# Patient Record
Sex: MALE | Race: WHITE | NOT HISPANIC OR LATINO | Employment: FULL TIME | URBAN - METROPOLITAN AREA
[De-identification: names, ages, dates, MRNs, and addresses within clinical notes are randomized per-mention and may not be internally consistent; named-entity substitution may affect disease eponyms.]

---

## 2020-02-15 ENCOUNTER — HOSPITAL ENCOUNTER (EMERGENCY)
Facility: HOSPITAL | Age: 40
Discharge: HOME | End: 2020-02-15
Admitting: EMERGENCY MEDICINE

## 2020-02-15 ENCOUNTER — APPOINTMENT (OUTPATIENT)
Dept: GENERAL RADIOLOGY | Facility: HOSPITAL | Age: 40
End: 2020-02-15

## 2020-02-15 VITALS
SYSTOLIC BLOOD PRESSURE: 124 MMHG | OXYGEN SATURATION: 100 % | HEART RATE: 84 BPM | TEMPERATURE: 97.4 F | RESPIRATION RATE: 16 BRPM | BODY MASS INDEX: 28.93 KG/M2 | HEIGHT: 67 IN | WEIGHT: 184.3 LBS | DIASTOLIC BLOOD PRESSURE: 74 MMHG

## 2020-02-15 DIAGNOSIS — M54.12 CERVICAL RADICULAR PAIN: Primary | ICD-10-CM

## 2020-02-15 PROCEDURE — 99283 EMERGENCY DEPT VISIT LOW MDM: CPT

## 2020-02-15 PROCEDURE — 72050 X-RAY EXAM NECK SPINE 4/5VWS: CPT

## 2020-02-15 PROCEDURE — 96372 THER/PROPH/DIAG INJ SC/IM: CPT

## 2020-02-15 PROCEDURE — 25010000002 KETOROLAC TROMETHAMINE PER 15 MG: Performed by: NURSE PRACTITIONER

## 2020-02-15 RX ORDER — KETOROLAC TROMETHAMINE 30 MG/ML
30 INJECTION, SOLUTION INTRAMUSCULAR; INTRAVENOUS ONCE
Status: COMPLETED | OUTPATIENT
Start: 2020-02-15 | End: 2020-02-15

## 2020-02-15 RX ORDER — CYCLOBENZAPRINE HCL 10 MG
10 TABLET ORAL ONCE
Status: COMPLETED | OUTPATIENT
Start: 2020-02-15 | End: 2020-02-15

## 2020-02-15 RX ORDER — CYCLOBENZAPRINE HCL 10 MG
10 TABLET ORAL 3 TIMES DAILY PRN
Qty: 15 TABLET | Refills: 0 | Status: SHIPPED | OUTPATIENT
Start: 2020-02-15 | End: 2022-10-18

## 2020-02-15 RX ORDER — METHYLPREDNISOLONE 4 MG/1
TABLET ORAL
Qty: 21 TABLET | Refills: 0 | Status: SHIPPED | OUTPATIENT
Start: 2020-02-15 | End: 2022-10-18

## 2020-02-15 RX ADMIN — CYCLOBENZAPRINE HYDROCHLORIDE 10 MG: 10 TABLET, FILM COATED ORAL at 15:38

## 2020-02-15 RX ADMIN — KETOROLAC TROMETHAMINE 30 MG: 30 INJECTION, SOLUTION INTRAMUSCULAR at 15:38

## 2020-02-15 NOTE — ED PROVIDER NOTES
Subjective   History:    40-year-old male presents the emergency department today for evaluation of neck and shoulder pain that is been present for 2 years but is gotten worse over the last 2 months he has started a new job where he has to do a lot of heavy lifting.  Patient states that he has numbness and tingling in the index and thumb bilaterally.  He has tried ibuprofen and Tylenol at home and has not had significant reduction in pain.      Onset: 2 years, worse over the last 2 months  Location: Cervical spine, radiating to shoulders, hands  Duration: Continuous  Character: Aching  Aggravating/Alleviating factors: Worse with lifting, no identified alleviating factors  Radiation: Shoulders, hands  Severity: Moderate to severe          Review of Systems   Constitutional: Negative for activity change, appetite change, diaphoresis, fatigue and fever.   HENT: Negative for congestion, facial swelling and sinus pressure.    Eyes: Negative for pain and visual disturbance.   Respiratory: Negative for cough, chest tightness and shortness of breath.    Cardiovascular: Negative for chest pain and palpitations.   Gastrointestinal: Negative for abdominal distention and abdominal pain.   Genitourinary: Negative for difficulty urinating and frequency.   Musculoskeletal: Positive for back pain, myalgias and neck pain. Negative for arthralgias and neck stiffness.        Neck pain per HPI   Skin: Negative for color change and rash.   Neurological: Positive for numbness. Negative for dizziness, syncope, weakness, light-headedness and headaches.       Past Medical History:   Diagnosis Date   • Injury of back        No Known Allergies    Past Surgical History:   Procedure Laterality Date   • APPENDECTOMY         History reviewed. No pertinent family history.    Social History     Socioeconomic History   • Marital status:      Spouse name: Not on file   • Number of children: Not on file   • Years of education: Not on file   •  Highest education level: Not on file   Tobacco Use   • Smoking status: Current Every Day Smoker     Packs/day: 1.00     Types: Cigarettes   Substance and Sexual Activity   • Alcohol use: Not Currently   • Drug use: Not Currently           Objective   Physical Exam   Constitutional: He is oriented to person, place, and time. He appears well-developed and well-nourished.   HENT:   Head: Normocephalic and atraumatic.   Eyes: Pupils are equal, round, and reactive to light. Conjunctivae and EOM are normal.   Neck: Neck supple. Muscular tenderness present. No spinous process tenderness present. No neck rigidity. Decreased range of motion present. No edema and no erythema present.   Range of motion in flexion extension and rotation due to pain.   Cardiovascular: Normal rate, regular rhythm, normal heart sounds and intact distal pulses.   Pulmonary/Chest: Effort normal and breath sounds normal.   Abdominal: Soft. Bowel sounds are normal.   Musculoskeletal:   Strength in bilateral upper extremities equal and normal, pulses intact   Neurological: He is alert and oriented to person, place, and time.   Skin: Skin is warm and dry. Capillary refill takes less than 2 seconds.   Psychiatric: He has a normal mood and affect. His behavior is normal. Judgment and thought content normal.       Procedures           ED Course      XR Spine Cervical Complete 4 or 5 View   Final Result   Normal cervical spine x-ray series.       Electronically Signed By-Brooks Hodge On:2/15/2020 3:48 PM   This report was finalized on [id number] by  Brooks Hodge, .        Medications   ketorolac (TORADOL) injection 30 mg (30 mg Intramuscular Given 2/15/20 1538)   cyclobenzaprine (FLEXERIL) tablet 10 mg (10 mg Oral Given 2/15/20 1538)                                             MDM  Number of Diagnoses or Management Options  Cervical radicular pain:   Diagnosis management comments: DISPOSITION:   Chart Review:  Comorbidity:  has a past medical  history of Injury of back.  Differentials:this list is not all inclusive and does not constitute the entirety of considered causes --> degenerative disc disease, herniated disc, cervical strain    Imaging: Was interpreted by physician and reviewed by myself:  Xr Spine Cervical Complete 4 Or 5 View    Result Date: 2/15/2020  Normal cervical spine x-ray series.  Electronically Signed By-Brooks Hodge On:2/15/2020 3:48 PM This report was finalized on [id number] by  Brooks Hodge, .      Disposition/Treatment:    40-year-old male presented to the emergency department today for evaluation of acute on chronic neck pain.  Patient described pain as being in shoulder but it originates in the neck and radiates there.  Patient was given Flexeril and Toradol in the emergency department and had minimal reduction of pain.  Cervical spine x-ray was obtained and was found to be normal.  Findings discussed with patient.  Patient will be discharged home stable condition with prescription for Medrol Dosepak and cyclobenzaprine.  Patient instructed to follow-up with primary care provider for further evaluation of neck pain.  Patient was also given neck exercises to perform to try to alleviate the pain.  Patient is agreeable to plan.  Will return to the emergency department with new or worsening symptoms.         Amount and/or Complexity of Data Reviewed  Tests in the radiology section of CPT®: reviewed        Final diagnoses:   Cervical radicular pain            Azeb Ortega, PATRICIA  02/15/20 1648

## 2020-02-15 NOTE — DISCHARGE INSTRUCTIONS
Take Medrol Dosepak until completion.  May resume ibuprofen when it is finished.  You may take Flexeril up to 3 times a day for muscle spasm.  Do not use these if you are going to be operating or around heavy machinery.  Perform neck stretches exercises daily.  Follow-up with primary care provider in 1 week if no better and return there or to the emergency department for any new or worsening symptoms.

## 2020-02-15 NOTE — ED NOTES
C/o left shoulder and neck pain x 2 yrs intermittently with worsening symptoms x 2 months since starting since starting new job     Rand Akbar RN  02/15/20 8305

## 2022-10-03 ENCOUNTER — HOSPITAL ENCOUNTER (OUTPATIENT)
Facility: HOSPITAL | Age: 42
Discharge: HOME | End: 2022-10-03
Attending: EMERGENCY MEDICINE | Admitting: EMERGENCY MEDICINE

## 2022-10-03 VITALS
OXYGEN SATURATION: 99 % | RESPIRATION RATE: 18 BRPM | DIASTOLIC BLOOD PRESSURE: 88 MMHG | BODY MASS INDEX: 29.03 KG/M2 | HEART RATE: 86 BPM | HEIGHT: 67 IN | WEIGHT: 185 LBS | TEMPERATURE: 98 F | SYSTOLIC BLOOD PRESSURE: 128 MMHG

## 2022-10-03 DIAGNOSIS — B97.89 VIRAL RESPIRATORY ILLNESS: Primary | ICD-10-CM

## 2022-10-03 DIAGNOSIS — J98.8 VIRAL RESPIRATORY ILLNESS: Primary | ICD-10-CM

## 2022-10-03 LAB
FLUAV SUBTYP SPEC NAA+PROBE: NOT DETECTED
FLUBV RNA ISLT QL NAA+PROBE: NOT DETECTED
SARS-COV-2 RNA RESP QL NAA+PROBE: NOT DETECTED
STREP A PCR: NOT DETECTED

## 2022-10-03 PROCEDURE — G0463 HOSPITAL OUTPT CLINIC VISIT: HCPCS | Performed by: EMERGENCY MEDICINE

## 2022-10-03 PROCEDURE — 87636 SARSCOV2 & INF A&B AMP PRB: CPT | Performed by: EMERGENCY MEDICINE

## 2022-10-03 PROCEDURE — 99202 OFFICE O/P NEW SF 15 MIN: CPT | Performed by: EMERGENCY MEDICINE

## 2022-10-03 PROCEDURE — 87651 STREP A DNA AMP PROBE: CPT | Performed by: EMERGENCY MEDICINE

## 2022-10-03 RX ORDER — BROMPHENIRAMINE MALEATE, PSEUDOEPHEDRINE HYDROCHLORIDE, AND DEXTROMETHORPHAN HYDROBROMIDE 2; 30; 10 MG/5ML; MG/5ML; MG/5ML
10 SYRUP ORAL 4 TIMES DAILY PRN
Qty: 118 ML | Refills: 0 | Status: SHIPPED | OUTPATIENT
Start: 2022-10-03 | End: 2022-10-18

## 2022-10-03 RX ORDER — ACETAMINOPHEN 500 MG
1000 TABLET ORAL ONCE
Status: COMPLETED | OUTPATIENT
Start: 2022-10-03 | End: 2022-10-03

## 2022-10-03 RX ORDER — IBUPROFEN 400 MG/1
400 TABLET ORAL ONCE
Status: COMPLETED | OUTPATIENT
Start: 2022-10-03 | End: 2022-10-03

## 2022-10-03 RX ADMIN — ACETAMINOPHEN 1000 MG: 500 TABLET ORAL at 11:23

## 2022-10-03 RX ADMIN — IBUPROFEN 400 MG: 400 TABLET, FILM COATED ORAL at 11:23

## 2022-10-03 NOTE — DISCHARGE INSTRUCTIONS
Please read the attached discharge instructions.  Come back to the emergency department for any new or concerning symptoms.    Take 400mg ibuprofen and 1000mg Tylenol together every 6 hours as needed for pain.

## 2022-10-03 NOTE — ED PROVIDER NOTES
[facility] FREE-STANDING ED / URGENT CARE    Patient: Roque Reveles 1980 [id number]  Physician: Kimberly Mares MD  DOS: 10/3/2022    [facility]  History of Present Illness  42-year-old male with no significant past medical history presents with 3 days of nasal congestion, body aches, chills, and sore throat.  He has taken Advil with no improvement.  He has had diarrhea and decreased appetite, but no nausea or vomiting.  Chills, but no fever.  No known sick contacts.        ROS  As reviewed in HPI above.    There is no problem list on file for this patient.    Prior to Admission medications    Medication Sig Start Date End Date Taking? Authorizing Provider   cyclobenzaprine (FLEXERIL) 10 MG tablet Take 1 tablet by mouth 3 (Three) Times a Day As Needed for Muscle Spasms for up to 15 doses. 2/15/20   Azeb Dunlap APRN   methylPREDNISolone (MEDROL, JUDI,) 4 MG tablet Take as directed on package instructions. 2/15/20   Azeb Dunlap APRN     Past Medical History:   Diagnosis Date   • Injury of back      Past Surgical History:   Procedure Laterality Date   • APPENDECTOMY       History reviewed. No pertinent family history.  Social History     Socioeconomic History   • Marital status:    Tobacco Use   • Smoking status: Current Every Day Smoker     Packs/day: 1.00     Types: Cigarettes   Substance and Sexual Activity   • Alcohol use: Not Currently   • Drug use: Not Currently     No Known Allergies    PHYSICAL EXAM  Vital Signs (last day)     Date/Time Temp Temp src Pulse Resp BP Patient Position SpO2    10/03/22 1047 98.2 (36.8) Oral 86 16 129/91 Sitting 98        Physical Exam  Vitals and nursing note reviewed.   Constitutional:       General: He is not in acute distress.     Appearance: He is well-developed.   HENT:      Head: Normocephalic and atraumatic.      Nose: Congestion present.      Mouth/Throat:      Comments: Mild posterior oropharynx erythema without significant tonsillar swelling.  Eyes:       Conjunctiva/sclera: Conjunctivae normal.   Cardiovascular:      Rate and Rhythm: Normal rate and regular rhythm.      Heart sounds: No murmur heard.  Pulmonary:      Effort: Pulmonary effort is normal.      Breath sounds: Normal breath sounds.   Abdominal:      General: There is no distension.      Palpations: Abdomen is soft.   Skin:     General: Skin is warm and dry.   Neurological:      Mental Status: He is alert.   Psychiatric:         Mood and Affect: Mood normal.         Behavior: Behavior normal.           DIAGNOSTICS  No radiology results for the last day    Labs Reviewed   COVID-19 AND FLU A/B, NP SWAB IN TRANSPORT MEDIA 8-12 HR TAT - Normal    Narrative:     Fact sheet for providers: https://www.fda.gov/media/812141/download    Fact sheet for patients: https://www.fda.gov/media/692115/download    Test performed by PCR.   RAPID STREP A SCREEN - Normal         MDM  Number of Diagnoses or Management Options  Viral respiratory illness  Diagnosis management comments: Flu, COVID, strep test are negative.  Presentation is most consistent with viral respiratory infection.  Chest pain is likely due to the cough and bronchial component of the illness.  Patient discharged home with Bromfed-DM.           Medications   acetaminophen (TYLENOL) tablet 1,000 mg (1,000 mg Oral Given 10/3/22 1123)   ibuprofen (ADVIL,MOTRIN) tablet 400 mg (400 mg Oral Given 10/3/22 1123)       Procedures    Final diagnoses:   Viral respiratory illness       PATIENT CONNECTION - [facility] [id number]  [phone]  In 1 week  If no improvement, If primary care is needed      ED Disposition     ED Disposition   Discharge    Condition   Stable    Comment   --             MD Vishnu Erickson, Kimberly Dillard MD  10/03/22 2119

## 2022-10-14 ENCOUNTER — HOSPITAL ENCOUNTER (OUTPATIENT)
Facility: HOSPITAL | Age: 42
Discharge: HOME | End: 2022-10-14
Attending: EMERGENCY MEDICINE | Admitting: EMERGENCY MEDICINE

## 2022-10-14 VITALS
RESPIRATION RATE: 16 BRPM | SYSTOLIC BLOOD PRESSURE: 147 MMHG | DIASTOLIC BLOOD PRESSURE: 96 MMHG | WEIGHT: 185 LBS | HEART RATE: 102 BPM | TEMPERATURE: 98 F | OXYGEN SATURATION: 100 % | BODY MASS INDEX: 29.03 KG/M2 | HEIGHT: 67 IN

## 2022-10-14 DIAGNOSIS — Z72.0 TOBACCO ABUSE: ICD-10-CM

## 2022-10-14 DIAGNOSIS — J40 BRONCHITIS: Primary | ICD-10-CM

## 2022-10-14 PROCEDURE — 25010000002 DEXAMETHASONE PER 1 MG: Performed by: EMERGENCY MEDICINE

## 2022-10-14 PROCEDURE — 99213 OFFICE O/P EST LOW 20 MIN: CPT | Performed by: EMERGENCY MEDICINE

## 2022-10-14 PROCEDURE — G0463 HOSPITAL OUTPT CLINIC VISIT: HCPCS | Performed by: EMERGENCY MEDICINE

## 2022-10-14 RX ORDER — BENZONATATE 200 MG/1
200 CAPSULE ORAL 3 TIMES DAILY PRN
Qty: 20 CAPSULE | Refills: 0 | Status: SHIPPED | OUTPATIENT
Start: 2022-10-14

## 2022-10-14 RX ORDER — AZITHROMYCIN 250 MG
TABLET ORAL
Qty: 6 TABLET | Refills: 0 | Status: SHIPPED | OUTPATIENT
Start: 2022-10-14 | End: 2022-10-18

## 2022-10-14 RX ORDER — FLUTICASONE PROPIONATE 50 MCG
2 SPRAY, SUSPENSION (ML) NASAL DAILY
Qty: 9.9 ML | Refills: 0 | Status: SHIPPED | OUTPATIENT
Start: 2022-10-14

## 2022-10-14 RX ADMIN — DEXAMETHASONE SODIUM PHOSPHATE 10 MG: 10 INJECTION, SOLUTION INTRAMUSCULAR; INTRAVENOUS at 14:08

## 2022-10-14 NOTE — ED PROVIDER NOTES
Subjective   History of Present Illness  Patient reports having few week duration of cough, congestion.  He is producing sputum.  He was diagnosed with viral syndrome weeks ago but issues are not improving.  He does smoke cigarettes.  Denies dyspnea.  When he lies down to sleep he is having issues sleeping due to his symptoms.  No respiratory distress or fevers        Review of Systems   All other systems reviewed and are negative.      Past Medical History:   Diagnosis Date   • Injury of back        No Known Allergies    Past Surgical History:   Procedure Laterality Date   • APPENDECTOMY         History reviewed. No pertinent family history.    Social History     Socioeconomic History   • Marital status:    Tobacco Use   • Smoking status: Every Day     Packs/day: 1.00     Types: Cigarettes   Substance and Sexual Activity   • Alcohol use: Not Currently   • Drug use: Not Currently           Objective   Physical Exam  Vitals and nursing note reviewed.   Constitutional:       Appearance: Normal appearance.   HENT:      Head: Normocephalic.      Right Ear: External ear normal.      Left Ear: External ear normal.      Nose: Nose normal.      Mouth/Throat:      Pharynx: Posterior oropharyngeal erythema present. No oropharyngeal exudate.   Eyes:      Conjunctiva/sclera: Conjunctivae normal.   Cardiovascular:      Rate and Rhythm: Normal rate.   Pulmonary:      Effort: Pulmonary effort is normal.      Breath sounds: Normal breath sounds.   Abdominal:      General: There is no distension.   Musculoskeletal:      Cervical back: Normal range of motion.   Skin:     Findings: No rash.   Neurological:      Mental Status: He is alert and oriented to person, place, and time.   Psychiatric:         Mood and Affect: Mood normal.         Procedures           ED Course                                           MDM  Number of Diagnoses or Management Options  Bronchitis  Tobacco abuse  Diagnosis management comments: With symptoms  over 2 weeks, will start antibiotics.  No wheezing on exam.  Vital signs look overall safe for discharge.  He is a smoker.  Antibiotic should help with bacterial component to his bronchitis      Final diagnoses:   Bronchitis   Tobacco abuse       ED Disposition  ED Disposition     ED Disposition   Discharge    Condition   Stable    Comment   --             No follow-up provider specified.       Medication List      New Prescriptions    benzonatate 200 MG capsule  Commonly known as: TESSALON  Take 1 capsule by mouth 3 (Three) Times a Day As Needed for Cough.     fluticasone 50 MCG/ACT nasal spray  Commonly known as: FLONASE  2 sprays into the nostril(s) as directed by provider Daily.     Zithromax 250 MG tablet  Generic drug: azithromycin  Zpak, as directed           Where to Get Your Medications      These medications were sent to [facility] [id number] - [place], [place] - [address] [place] [address] - [phone]  - [phone] [address] [place] [address]    Phone: [phone]   · benzonatate 200 MG capsule  · fluticasone 50 MCG/ACT nasal spray  · Zithromax 250 MG tablet          Micha Rodriguez MD  10/14/22 7304

## 2022-10-18 ENCOUNTER — APPOINTMENT (OUTPATIENT)
Dept: CT IMAGING | Facility: HOSPITAL | Age: 42
End: 2022-10-18

## 2022-10-18 ENCOUNTER — APPOINTMENT (OUTPATIENT)
Dept: GENERAL RADIOLOGY | Facility: HOSPITAL | Age: 42
End: 2022-10-18

## 2022-10-18 ENCOUNTER — HOSPITAL ENCOUNTER (OUTPATIENT)
Facility: HOSPITAL | Age: 42
Setting detail: OBSERVATION
Discharge: HOME | End: 2022-10-19
Attending: EMERGENCY MEDICINE | Admitting: EMERGENCY MEDICINE

## 2022-10-18 DIAGNOSIS — J12.9 VIRAL PNEUMONIA: ICD-10-CM

## 2022-10-18 DIAGNOSIS — R06.02 SHORTNESS OF BREATH: Primary | ICD-10-CM

## 2022-10-18 DIAGNOSIS — B34.8 RHINOVIRUS: ICD-10-CM

## 2022-10-18 LAB
ALBUMIN SERPL-MCNC: 3.9 G/DL (ref 3.5–5.2)
ALBUMIN/GLOB SERPL: 1.2 G/DL
ALP SERPL-CCNC: 86 U/L (ref 39–117)
ALT SERPL W P-5'-P-CCNC: 21 U/L (ref 1–41)
ANION GAP SERPL CALCULATED.3IONS-SCNC: 13 MMOL/L (ref 5–15)
AST SERPL-CCNC: 19 U/L (ref 1–40)
B PARAPERT DNA SPEC QL NAA+PROBE: NOT DETECTED
B PERT DNA SPEC QL NAA+PROBE: NOT DETECTED
BILIRUB SERPL-MCNC: 0.4 MG/DL (ref 0–1.2)
BUN SERPL-MCNC: 18 MG/DL (ref 6–20)
BUN/CREAT SERPL: 18.2 (ref 7–25)
C PNEUM DNA NPH QL NAA+NON-PROBE: NOT DETECTED
CALCIUM SPEC-SCNC: 8.9 MG/DL (ref 8.6–10.5)
CHLORIDE SERPL-SCNC: 101 MMOL/L (ref 98–107)
CO2 SERPL-SCNC: 24 MMOL/L (ref 22–29)
CREAT SERPL-MCNC: 0.99 MG/DL (ref 0.76–1.27)
D DIMER PPP FEU-MCNC: <0.19 MG/L (FEU) (ref 0–0.59)
D-LACTATE SERPL-SCNC: 1.1 MMOL/L (ref 0.5–2)
DEPRECATED RDW RBC AUTO: 44.2 FL (ref 37–54)
EGFRCR SERPLBLD CKD-EPI 2021: 96.9 ML/MIN/1.73
EOSINOPHIL # BLD MANUAL: 0.38 10*3/MM3 (ref 0–0.4)
EOSINOPHIL NFR BLD MANUAL: 3 % (ref 0.3–6.2)
ERYTHROCYTE [DISTWIDTH] IN BLOOD BY AUTOMATED COUNT: 13.9 % (ref 12.3–15.4)
ERYTHROCYTE [SEDIMENTATION RATE] IN BLOOD: 57 MM/HR (ref 0–15)
FLUAV SUBTYP SPEC NAA+PROBE: NOT DETECTED
FLUBV RNA ISLT QL NAA+PROBE: NOT DETECTED
GLOBULIN UR ELPH-MCNC: 3.3 GM/DL
GLUCOSE SERPL-MCNC: 128 MG/DL (ref 65–99)
HADV DNA SPEC NAA+PROBE: NOT DETECTED
HCOV 229E RNA SPEC QL NAA+PROBE: NOT DETECTED
HCOV HKU1 RNA SPEC QL NAA+PROBE: NOT DETECTED
HCOV NL63 RNA SPEC QL NAA+PROBE: NOT DETECTED
HCOV OC43 RNA SPEC QL NAA+PROBE: NOT DETECTED
HCT VFR BLD AUTO: 44.1 % (ref 37.5–51)
HGB BLD-MCNC: 14.7 G/DL (ref 13–17.7)
HMPV RNA NPH QL NAA+NON-PROBE: NOT DETECTED
HPIV1 RNA ISLT QL NAA+PROBE: NOT DETECTED
HPIV2 RNA SPEC QL NAA+PROBE: NOT DETECTED
HPIV3 RNA NPH QL NAA+PROBE: NOT DETECTED
HPIV4 P GENE NPH QL NAA+PROBE: NOT DETECTED
LYMPHOCYTES # BLD MANUAL: 1.51 10*3/MM3 (ref 0.7–3.1)
LYMPHOCYTES NFR BLD MANUAL: 10 % (ref 5–12)
M PNEUMO IGG SER IA-ACNC: NOT DETECTED
MCH RBC QN AUTO: 30.3 PG (ref 26.6–33)
MCHC RBC AUTO-ENTMCNC: 33.2 G/DL (ref 31.5–35.7)
MCV RBC AUTO: 91.1 FL (ref 79–97)
MONOCYTES # BLD: 1.26 10*3/MM3 (ref 0.1–0.9)
MYELOCYTES NFR BLD MANUAL: 1 % (ref 0–0)
NEUTROPHILS # BLD AUTO: 9.32 10*3/MM3 (ref 1.7–7)
NEUTROPHILS NFR BLD MANUAL: 71 % (ref 42.7–76)
NEUTS BAND NFR BLD MANUAL: 3 % (ref 0–5)
NT-PROBNP SERPL-MCNC: 66.1 PG/ML (ref 0–450)
PLAT MORPH BLD: NORMAL
PLATELET # BLD AUTO: 223 10*3/MM3 (ref 140–450)
PMV BLD AUTO: 9.3 FL (ref 6–12)
POTASSIUM SERPL-SCNC: 4.2 MMOL/L (ref 3.5–5.2)
PROCALCITONIN SERPL-MCNC: 0.06 NG/ML (ref 0–0.25)
PROT SERPL-MCNC: 7.2 G/DL (ref 6–8.5)
QT INTERVAL: 359 MS
RBC # BLD AUTO: 4.84 10*6/MM3 (ref 4.14–5.8)
RBC MORPH BLD: NORMAL
RHINOVIRUS RNA SPEC NAA+PROBE: DETECTED
RSV RNA NPH QL NAA+NON-PROBE: NOT DETECTED
SARS-COV-2 RNA NPH QL NAA+NON-PROBE: NOT DETECTED
SCAN SLIDE: NORMAL
SODIUM SERPL-SCNC: 138 MMOL/L (ref 136–145)
TROPONIN T SERPL-MCNC: <0.01 NG/ML (ref 0–0.03)
VARIANT LYMPHS NFR BLD MANUAL: 12 % (ref 19.6–45.3)
WBC MORPH BLD: NORMAL
WBC NRBC COR # BLD: 12.6 10*3/MM3 (ref 3.4–10.8)

## 2022-10-18 PROCEDURE — 84145 PROCALCITONIN (PCT): CPT | Performed by: EMERGENCY MEDICINE

## 2022-10-18 PROCEDURE — 0202U NFCT DS 22 TRGT SARS-COV-2: CPT | Performed by: EMERGENCY MEDICINE

## 2022-10-18 PROCEDURE — 25010000002 METHYLPREDNISOLONE PER 125 MG: Performed by: EMERGENCY MEDICINE

## 2022-10-18 PROCEDURE — 85025 COMPLETE CBC W/AUTO DIFF WBC: CPT | Performed by: EMERGENCY MEDICINE

## 2022-10-18 PROCEDURE — 83605 ASSAY OF LACTIC ACID: CPT

## 2022-10-18 PROCEDURE — 87040 BLOOD CULTURE FOR BACTERIA: CPT | Performed by: EMERGENCY MEDICINE

## 2022-10-18 PROCEDURE — 36415 COLL VENOUS BLD VENIPUNCTURE: CPT

## 2022-10-18 PROCEDURE — 96375 TX/PRO/DX INJ NEW DRUG ADDON: CPT

## 2022-10-18 PROCEDURE — 94640 AIRWAY INHALATION TREATMENT: CPT

## 2022-10-18 PROCEDURE — 25010000002 KETOROLAC TROMETHAMINE PER 15 MG: Performed by: EMERGENCY MEDICINE

## 2022-10-18 PROCEDURE — 97602 WOUND(S) CARE NON-SELECTIVE: CPT

## 2022-10-18 PROCEDURE — 25010000002 MORPHINE PER 10 MG: Performed by: EMERGENCY MEDICINE

## 2022-10-18 PROCEDURE — 85379 FIBRIN DEGRADATION QUANT: CPT | Performed by: EMERGENCY MEDICINE

## 2022-10-18 PROCEDURE — G0378 HOSPITAL OBSERVATION PER HR: HCPCS

## 2022-10-18 PROCEDURE — 94799 UNLISTED PULMONARY SVC/PX: CPT

## 2022-10-18 PROCEDURE — 84484 ASSAY OF TROPONIN QUANT: CPT | Performed by: EMERGENCY MEDICINE

## 2022-10-18 PROCEDURE — 96365 THER/PROPH/DIAG IV INF INIT: CPT

## 2022-10-18 PROCEDURE — 85652 RBC SED RATE AUTOMATED: CPT | Performed by: EMERGENCY MEDICINE

## 2022-10-18 PROCEDURE — 71046 X-RAY EXAM CHEST 2 VIEWS: CPT

## 2022-10-18 PROCEDURE — 96376 TX/PRO/DX INJ SAME DRUG ADON: CPT

## 2022-10-18 PROCEDURE — 71250 CT THORAX DX C-: CPT

## 2022-10-18 PROCEDURE — 93005 ELECTROCARDIOGRAM TRACING: CPT | Performed by: EMERGENCY MEDICINE

## 2022-10-18 PROCEDURE — 25010000002 ONDANSETRON PER 1 MG: Performed by: EMERGENCY MEDICINE

## 2022-10-18 PROCEDURE — 99285 EMERGENCY DEPT VISIT HI MDM: CPT

## 2022-10-18 PROCEDURE — 80053 COMPREHEN METABOLIC PANEL: CPT | Performed by: EMERGENCY MEDICINE

## 2022-10-18 PROCEDURE — 83880 ASSAY OF NATRIURETIC PEPTIDE: CPT | Performed by: EMERGENCY MEDICINE

## 2022-10-18 PROCEDURE — 85007 BL SMEAR W/DIFF WBC COUNT: CPT | Performed by: EMERGENCY MEDICINE

## 2022-10-18 PROCEDURE — 25010000002 LEVOFLOXACIN PER 250 MG: Performed by: NURSE PRACTITIONER

## 2022-10-18 PROCEDURE — 25010000002 ONDANSETRON PER 1 MG: Performed by: NURSE PRACTITIONER

## 2022-10-18 PROCEDURE — 96361 HYDRATE IV INFUSION ADD-ON: CPT

## 2022-10-18 RX ORDER — AMOXICILLIN 250 MG
2 CAPSULE ORAL 2 TIMES DAILY
Status: DISCONTINUED | OUTPATIENT
Start: 2022-10-18 | End: 2022-10-19 | Stop reason: HOSPADM

## 2022-10-18 RX ORDER — METHYLPREDNISOLONE SODIUM SUCCINATE 125 MG/2ML
125 INJECTION, POWDER, LYOPHILIZED, FOR SOLUTION INTRAMUSCULAR; INTRAVENOUS ONCE
Status: COMPLETED | OUTPATIENT
Start: 2022-10-18 | End: 2022-10-18

## 2022-10-18 RX ORDER — ONDANSETRON 2 MG/ML
4 INJECTION INTRAMUSCULAR; INTRAVENOUS ONCE
Status: COMPLETED | OUTPATIENT
Start: 2022-10-18 | End: 2022-10-18

## 2022-10-18 RX ORDER — SODIUM CHLORIDE 0.9 % (FLUSH) 0.9 %
10 SYRINGE (ML) INJECTION AS NEEDED
Status: DISCONTINUED | OUTPATIENT
Start: 2022-10-18 | End: 2022-10-19 | Stop reason: HOSPADM

## 2022-10-18 RX ORDER — IPRATROPIUM BROMIDE AND ALBUTEROL SULFATE 2.5; .5 MG/3ML; MG/3ML
3 SOLUTION RESPIRATORY (INHALATION)
Status: DISCONTINUED | OUTPATIENT
Start: 2022-10-18 | End: 2022-10-19 | Stop reason: HOSPADM

## 2022-10-18 RX ORDER — ALBUTEROL SULFATE 2.5 MG/3ML
2.5 SOLUTION RESPIRATORY (INHALATION) EVERY 4 HOURS PRN
Status: DISCONTINUED | OUTPATIENT
Start: 2022-10-18 | End: 2022-10-19 | Stop reason: HOSPADM

## 2022-10-18 RX ORDER — ONDANSETRON 2 MG/ML
4 INJECTION INTRAMUSCULAR; INTRAVENOUS EVERY 6 HOURS PRN
Status: DISCONTINUED | OUTPATIENT
Start: 2022-10-18 | End: 2022-10-19 | Stop reason: HOSPADM

## 2022-10-18 RX ORDER — BISACODYL 5 MG/1
5 TABLET, DELAYED RELEASE ORAL DAILY PRN
Status: DISCONTINUED | OUTPATIENT
Start: 2022-10-18 | End: 2022-10-19 | Stop reason: HOSPADM

## 2022-10-18 RX ORDER — BENZONATATE 100 MG/1
200 CAPSULE ORAL 3 TIMES DAILY PRN
Status: DISCONTINUED | OUTPATIENT
Start: 2022-10-18 | End: 2022-10-19 | Stop reason: HOSPADM

## 2022-10-18 RX ORDER — SODIUM CHLORIDE 9 MG/ML
100 INJECTION, SOLUTION INTRAVENOUS CONTINUOUS
Status: DISCONTINUED | OUTPATIENT
Start: 2022-10-18 | End: 2022-10-19 | Stop reason: HOSPADM

## 2022-10-18 RX ORDER — ACETAMINOPHEN 500 MG
1000 TABLET ORAL ONCE
Status: COMPLETED | OUTPATIENT
Start: 2022-10-18 | End: 2022-10-18

## 2022-10-18 RX ORDER — LEVOFLOXACIN 5 MG/ML
750 INJECTION, SOLUTION INTRAVENOUS EVERY 24 HOURS
Status: DISCONTINUED | OUTPATIENT
Start: 2022-10-18 | End: 2022-10-19 | Stop reason: HOSPADM

## 2022-10-18 RX ORDER — POLYETHYLENE GLYCOL 3350 17 G/17G
17 POWDER, FOR SOLUTION ORAL DAILY PRN
Status: DISCONTINUED | OUTPATIENT
Start: 2022-10-18 | End: 2022-10-19 | Stop reason: HOSPADM

## 2022-10-18 RX ORDER — ONDANSETRON 4 MG/1
4 TABLET, FILM COATED ORAL EVERY 6 HOURS PRN
Status: DISCONTINUED | OUTPATIENT
Start: 2022-10-18 | End: 2022-10-19 | Stop reason: HOSPADM

## 2022-10-18 RX ORDER — BISACODYL 10 MG
10 SUPPOSITORY, RECTAL RECTAL DAILY PRN
Status: DISCONTINUED | OUTPATIENT
Start: 2022-10-18 | End: 2022-10-19 | Stop reason: HOSPADM

## 2022-10-18 RX ORDER — SODIUM CHLORIDE 0.9 % (FLUSH) 0.9 %
10 SYRINGE (ML) INJECTION EVERY 12 HOURS SCHEDULED
Status: DISCONTINUED | OUTPATIENT
Start: 2022-10-18 | End: 2022-10-19 | Stop reason: HOSPADM

## 2022-10-18 RX ORDER — HYDROCODONE BITARTRATE AND ACETAMINOPHEN 5; 325 MG/1; MG/1
1 TABLET ORAL EVERY 4 HOURS PRN
Status: DISCONTINUED | OUTPATIENT
Start: 2022-10-18 | End: 2022-10-19 | Stop reason: HOSPADM

## 2022-10-18 RX ORDER — KETOROLAC TROMETHAMINE 15 MG/ML
15 INJECTION, SOLUTION INTRAMUSCULAR; INTRAVENOUS ONCE
Status: COMPLETED | OUTPATIENT
Start: 2022-10-18 | End: 2022-10-18

## 2022-10-18 RX ADMIN — IPRATROPIUM BROMIDE AND ALBUTEROL SULFATE 3 ML: .5; 3 SOLUTION RESPIRATORY (INHALATION) at 22:45

## 2022-10-18 RX ADMIN — MORPHINE SULFATE 4 MG: 4 INJECTION, SOLUTION INTRAMUSCULAR; INTRAVENOUS at 16:32

## 2022-10-18 RX ADMIN — LEVOFLOXACIN 750 MG: 5 INJECTION, SOLUTION INTRAVENOUS at 20:22

## 2022-10-18 RX ADMIN — BENZONATATE 200 MG: 100 CAPSULE ORAL at 20:21

## 2022-10-18 RX ADMIN — SODIUM CHLORIDE 100 ML/HR: 9 INJECTION, SOLUTION INTRAVENOUS at 20:22

## 2022-10-18 RX ADMIN — Medication 10 ML: at 20:21

## 2022-10-18 RX ADMIN — KETOROLAC TROMETHAMINE 15 MG: 15 INJECTION, SOLUTION INTRAMUSCULAR; INTRAVENOUS at 13:13

## 2022-10-18 RX ADMIN — SODIUM CHLORIDE, POTASSIUM CHLORIDE, SODIUM LACTATE AND CALCIUM CHLORIDE 1000 ML: 600; 310; 30; 20 INJECTION, SOLUTION INTRAVENOUS at 13:13

## 2022-10-18 RX ADMIN — ACETAMINOPHEN 1000 MG: 500 TABLET ORAL at 13:15

## 2022-10-18 RX ADMIN — METHYLPREDNISOLONE SODIUM SUCCINATE 125 MG: 125 INJECTION, POWDER, FOR SOLUTION INTRAMUSCULAR; INTRAVENOUS at 16:29

## 2022-10-18 RX ADMIN — HYDROCODONE BITARTRATE AND ACETAMINOPHEN 1 TABLET: 5; 325 TABLET ORAL at 23:43

## 2022-10-18 RX ADMIN — ONDANSETRON 4 MG: 2 INJECTION INTRAMUSCULAR; INTRAVENOUS at 16:26

## 2022-10-18 RX ADMIN — ONDANSETRON 4 MG: 2 INJECTION INTRAMUSCULAR; INTRAVENOUS at 23:46

## 2022-10-18 RX ADMIN — ONDANSETRON 4 MG: 2 INJECTION INTRAMUSCULAR; INTRAVENOUS at 13:10

## 2022-10-18 RX ADMIN — HYDROCODONE BITARTRATE AND ACETAMINOPHEN 1 TABLET: 5; 325 TABLET ORAL at 20:21

## 2022-10-18 NOTE — ED PROVIDER NOTES
Subjective   History of Present Illness  Chief complaint chest pain short of breath hurts to breathe fever    History of present illness 42-year-old male who has been sick since October 4 with cough congestion and feeling ill.  The patient had initially been seen at an urgent care center and was diagnosed with a viral infection had negative COVID-19 test.  He was treated supportively with some steroids and cough syrup.  He states he really had not improved and he developed a fever and I gave him Zithromax for possible bacterial infection.  He states that last couple days he has had a sharp pain in his chest that hurts to take a deep breath and cough is diffuse nonradiating.  He had a fever of 101 yesterday.  No vomiting or diarrhea.  Cough has been somewhat productive.  He denies any leg pain or swelling or recent long car ride plane or immobilization foreign travels and pain is severe.  Hurts worse when takes deep breath and when he coughs and moves.  There is no exertional component nothing really seems to make it better.  No one in the home with similar illness.  And currently on Zithromax.        Review of Systems   Constitutional: Positive for chills and fever.   HENT: Positive for congestion. Negative for sinus pressure.    Eyes: Negative for photophobia and visual disturbance.   Respiratory: Positive for cough, chest tightness and shortness of breath.    Cardiovascular: Positive for chest pain. Negative for palpitations.   Gastrointestinal: Negative for abdominal pain and vomiting.   Endocrine: Negative for cold intolerance and heat intolerance.   Genitourinary: Negative for difficulty urinating and dysuria.   Musculoskeletal: Negative for back pain and neck pain.   Skin: Negative for color change and rash.   Neurological: Positive for weakness. Negative for light-headedness.   Psychiatric/Behavioral: Negative for agitation and confusion.       Past Medical History:   Diagnosis Date   • Injury of back         No Known Allergies    Past Surgical History:   Procedure Laterality Date   • APPENDECTOMY         No family history on file.    Social History     Socioeconomic History   • Marital status:    Tobacco Use   • Smoking status: Every Day     Packs/day: 1.00     Types: Cigarettes   Substance and Sexual Activity   • Alcohol use: Not Currently   • Drug use: Not Currently     Prior to Admission medications    Medication Sig Start Date End Date Taking? Authorizing Provider   benzonatate (TESSALON) 200 MG capsule Take 1 capsule by mouth 3 (Three) Times a Day As Needed for Cough. 10/14/22   Micha Rodriguez MD   brompheniramine-pseudoephedrine-DM 30-2-10 MG/5ML syrup Take 10 mL by mouth 4 (Four) Times a Day As Needed for Congestion or Cough. 10/3/22   Kimberly Mares MD   cyclobenzaprine (FLEXERIL) 10 MG tablet Take 1 tablet by mouth 3 (Three) Times a Day As Needed for Muscle Spasms for up to 15 doses. 2/15/20   Azeb Dunlap APRN   fluticasone (FLONASE) 50 MCG/ACT nasal spray 2 sprays into the nostril(s) as directed by provider Daily. 10/14/22   Micha Rodriguez MD   methylPREDNISolone (MEDROL, JUDI,) 4 MG tablet Take as directed on package instructions. 2/15/20   Azeb Dunlap APRN   Zithromax 250 MG tablet Zpak, as directed 10/14/22   Micha Rodriguez MD             Objective   Physical Exam  Constitutional this is a 42-year-old male awake alert no acute distress temperature 98.3 heart rate of 82 sats 99% on room air blood pressure 150/105.  Respiratory rate of 24.  HEENT extraocular muscles are intact pupils equal round reactive sclera clear.  Mouth clear.  Neck supple no adenopathy no JV no bruits.  Lungs some scattered rhonchi throughout but no retraction no use of accessories.  Heart regular without murmur rub.  Abdomen soft without tenderness good bowel sounds no peritoneal findings or pulsatile masses.  Extremities pulses equal throughout upper and lower extremities no edema cords or  Homans' sign or evidence of DVT.  Skin is warm and dry without rashes or cellulitic changes.  Neurologic awake alert follows commands motor strength normal without focal weakness  Procedures           ED Course      Results for orders placed or performed during the hospital encounter of 10/18/22   Respiratory Panel PCR w/COVID-19(SARS-CoV-2) D[facility]/ANURAG/DOV/PAD/COR/MAD/BENJI In-House, NP Swab in UTM/VTM, 3-4 HR TAT - Swab, Nasopharynx    Specimen: Nasopharynx; Swab   Result Value Ref Range    ADENOVIRUS, PCR Not Detected Not Detected    Coronavirus 229E Not Detected Not Detected    Coronavirus HKU1 Not Detected Not Detected    Coronavirus NL63 Not Detected Not Detected    Coronavirus OC43 Not Detected Not Detected    COVID19 Not Detected Not Detected - Ref. Range    Human Metapneumovirus Not Detected Not Detected    Human Rhinovirus/Enterovirus Detected (A) Not Detected    Influenza A PCR Not Detected Not Detected    Influenza B PCR Not Detected Not Detected    Parainfluenza Virus 1 Not Detected Not Detected    Parainfluenza Virus 2 Not Detected Not Detected    Parainfluenza Virus 3 Not Detected Not Detected    Parainfluenza Virus 4 Not Detected Not Detected    RSV, PCR Not Detected Not Detected    Bordetella pertussis pcr Not Detected Not Detected    Bordetella parapertussis PCR Not Detected Not Detected    Chlamydophila pneumoniae PCR Not Detected Not Detected    Mycoplasma pneumo by PCR Not Detected Not Detected   Comprehensive Metabolic Panel    Specimen: Blood   Result Value Ref Range    Glucose 128 (H) 65 - 99 mg/dL    BUN 18 6 - 20 mg/dL    Creatinine 0.99 0.76 - 1.27 mg/dL    Sodium 138 136 - 145 mmol/L    Potassium 4.2 3.5 - 5.2 mmol/L    Chloride 101 98 - 107 mmol/L    CO2 24.0 22.0 - 29.0 mmol/L    Calcium 8.9 8.6 - 10.5 mg/dL    Total Protein 7.2 6.0 - 8.5 g/dL    Albumin 3.90 3.50 - 5.20 g/dL    ALT (SGPT) 21 1 - 41 U/L    AST (SGOT) 19 1 - 40 U/L    Alkaline Phosphatase 86 39 - 117 U/L    Total Bilirubin 0.4  0.0 - 1.2 mg/dL    Globulin 3.3 gm/dL    A/G Ratio 1.2 g/dL    BUN/Creatinine Ratio 18.2 7.0 - 25.0    Anion Gap 13.0 5.0 - 15.0 mmol/L    eGFR 96.9 >60.0 mL/min/1.73   Troponin    Specimen: Blood   Result Value Ref Range    Troponin T <0.010 0.000 - 0.030 ng/mL   D-dimer, Quantitative    Specimen: Blood   Result Value Ref Range    D-Dimer, Quantitative <0.19 0.00 - 0.59 mg/L (FEU)   BNP    Specimen: Blood   Result Value Ref Range    proBNP 66.1 0.0 - 450.0 pg/mL   Procalcitonin    Specimen: Blood   Result Value Ref Range    Procalcitonin 0.06 0.00 - 0.25 ng/mL   Sedimentation Rate    Specimen: Blood   Result Value Ref Range    Sed Rate 57 (H) 0 - 15 mm/hr   CBC Auto Differential    Specimen: Blood   Result Value Ref Range    WBC 12.60 (H) 3.40 - 10.80 10*3/mm3    RBC 4.84 4.14 - 5.80 10*6/mm3    Hemoglobin 14.7 13.0 - 17.7 g/dL    Hematocrit 44.1 37.5 - 51.0 %    MCV 91.1 79.0 - 97.0 fL    MCH 30.3 26.6 - 33.0 pg    MCHC 33.2 31.5 - 35.7 g/dL    RDW 13.9 12.3 - 15.4 %    RDW-SD 44.2 37.0 - 54.0 fl    MPV 9.3 6.0 - 12.0 fL    Platelets 223 140 - 450 10*3/mm3   Scan Slide    Specimen: Blood   Result Value Ref Range    Scan Slide     Manual Differential    Specimen: Blood   Result Value Ref Range    Neutrophil % 71.0 42.7 - 76.0 %    Lymphocyte % 12.0 (L) 19.6 - 45.3 %    Monocyte % 10.0 5.0 - 12.0 %    Eosinophil % 3.0 0.3 - 6.2 %    Bands %  3.0 0.0 - 5.0 %    Myelocyte % 1.0 (H) 0.0 - 0.0 %    Neutrophils Absolute 9.32 (H) 1.70 - 7.00 10*3/mm3    Lymphocytes Absolute 1.51 0.70 - 3.10 10*3/mm3    Monocytes Absolute 1.26 (H) 0.10 - 0.90 10*3/mm3    Eosinophils Absolute 0.38 0.00 - 0.40 10*3/mm3    RBC Morphology Normal Normal    WBC Morphology Normal Normal    Platelet Morphology Normal Normal   POC Lactate    Specimen: Blood   Result Value Ref Range    Lactate 1.1 0.5 - 2.0 mmol/L   ECG 12 Lead   Result Value Ref Range    QT Interval 359 ms     XR Chest 2 View    Result Date: 10/18/2022    1.  Mild cardiomegaly.  No  acute cardia pulmonary disease.   Electronically Signed By-Chandler Conway MD On:10/18/2022 2:06 PM This report was finalized on [id number] by  Chandler Conway MD.    CT Chest Without Contrast Diagnostic    Result Date: 10/18/2022  Bilateral peripheral groundglass opacities (right lung greater than left lung), concerning for atypical pneumonia.    Electronically Signed By-Kimberly Morel MD On:10/18/2022 3:28 PM This report was finalized on [id number] by  Kimberly Morel MD.    Medications   sodium chloride 0.9 % flush 10 mL (has no administration in time range)   methylPREDNISolone sodium succinate (SOLU-Medrol) injection 125 mg (has no administration in time range)   morphine injection 4 mg (has no administration in time range)   ondansetron (ZOFRAN) injection 4 mg (has no administration in time range)   lactated ringers bolus 1,000 mL (0 mL Intravenous Stopped 10/18/22 1440)   acetaminophen (TYLENOL) tablet 1,000 mg (1,000 mg Oral Given 10/18/22 1315)   ondansetron (ZOFRAN) injection 4 mg (4 mg Intravenous Given 10/18/22 1310)   ketorolac (TORADOL) injection 15 mg (15 mg Intravenous Given 10/18/22 1313)     EKG my interpretation normal sinus rhythm rate of 74 left axis deviation early repolarization no acute ST elevation QTC of 399 otherwise unremarkable EKG                                       MDM  Number of Diagnoses or Management Options  Rhinovirus: new and requires workup  Shortness of breath: new and requires workup  Viral pneumonia: new and requires workup  Diagnosis management comments: Medical decision making.  IV established placed on the monitor as well as sinus rhythm on my review.  EKG obtained reviewed by me is normal sinus rhythm without acute ST elevation.  Patient given Toradol 15 mg IV and Zofran 4 mg IV and had the above evaluation.  Chest x-ray obtained some cardiomegaly mild but no active disease per my review as well as radiology labs obtained CBC white count 12.6.  Procalcitonin normal  sed rate 57 proBNP 66 D-dimer normal troponin negative.  Chemistries normal.  The lactate was 1.1.  The patient continues to complain of pain when he takes deep breath.  Feeling short of breath.  D-dimer negative no swelling in the legs.  He underwent a CT chest without which showed bilateral peripheral groundglass opacities concerning for atypical pneumonia.  Cultures were pending.  Patient was given morphine 4 IV and Zofran 4 IV for pain and he was given Solu-Medrol IV as well.  I do not see evidence of suggest acute bacterial infection.  This is most consistent with a viral infection which he has noted with a rhinovirus.  Patient was made aware of the findings.  Talked about this.  I do not sense acute myocardial infarction or ischemia DVT or pulm embolism or aortic dissection or pulmonary embolism.  This is based on the history and findings above.  No evidence of pericarditis or pericardial effusion.  Patiently placed in observation provider notified stable otherwise unremarkable ER course.  All labs chest x-ray EKG CT reviewed by me.       Amount and/or Complexity of Data Reviewed  Clinical lab tests: reviewed  Tests in the radiology section of CPT®: reviewed  Discuss the patient with other providers: yes    Risk of Complications, Morbidity, and/or Mortality  Presenting problems: high  Diagnostic procedures: high  Management options: high    Patient Progress  Patient progress: stable      Final diagnoses:   Shortness of breath   Rhinovirus   Viral pneumonia       ED Disposition  ED Disposition     ED Disposition   Decision to Admit    Condition   --    Comment   Level of Care: Observation Unit [28]   Diagnosis: Shortness of breath [786.05.ICD-9-CM]   Admitting Physician: KRYSTEN LUCIO [[id number]]   Attending Physician: KRYSTEN LUCIO [[id number]]               No follow-up provider specified.       Medication List      No changes were made to your prescriptions during this visit.          Krysten Lucio MD  10/18/22  [id number]

## 2022-10-18 NOTE — PROGRESS NOTES
Synopsis  Nursing report ED to floor  Roque Reveles  42 y.o.  male    HPI:   Chief Complaint   Patient presents with    Chest Pain       Admitting doctor:   Malik Lucio MD    Admitting diagnosis:   The primary encounter diagnosis was Shortness of breath. Diagnoses of Rhinovirus and Viral pneumonia were also pertinent to this visit.    Code status:   Current Code Status       Date Active Code Status Order ID Comments User Context       10/18/2022 1551 CPR (Attempt to Resuscitate) [id number]  Devika Schuler APRN ED        Question Answer    Code Status (Patient has no pulse and is not breathing) CPR (Attempt to Resuscitate)    Medical Interventions (Patient has pulse or is breathing) Full Support    Level Of Support Discussed With Patient                    Allergies:   Patient has no known allergies.    Isolation:  Enhanced Droplet/Contact      Fall Risk:  Fall Risk Assessment was completed, and patient is at low risk for falls.   Predictive Model Details         3 (Low) Factor Value    Calculated 10/18/2022 18:06 Age 42    Risk of Fall Model Musculoskeletal Assessment WDL     Active Peripheral IV Present     Imaging order in this encounter Present     Number of Distinct Medication Classes administered 6     Skin Assessment WDL     Magnesium not on file     Diastolic BP 62     Drug Use No     Tobacco Use Current     Caesar Scale not on file     Number of administrations of Anti-Rheumatics 1     Peripheral Vascular Assessment WDL     Calcium 8.9 mg/dL     Financial Class Medicaid     Sex Male     Chloride 101 mmol/L     Albumin 3.9 g/dL     Gastrointestinal Assessment WDL     Number of administrations of Analgesic Narcotics 1     Cardiac Assessment WDL     Respiratory Rate 16     Days after Admission 0.227     Total Bilirubin 0.4 mg/dL     ALT 21 U/L     Potassium 4.2 mmol/L     Creatinine 0.99 mg/dL         Weight:       10/18/22  1245   Weight: 83.9 kg (185 lb)       Intake and Output    Intake/Output Summary  (Last 24 hours) at 10/18/2022 1826  Last data filed at 10/18/2022 1440  Gross per 24 hour   Intake 1000 ml   Output --   Net 1000 ml       Diet:   Dietary Orders (From admission, onward)       Start     Ordered    10/18/22 1558  Diet Regular  Diet Effective Now        Question:  Diet / Texture / Consistency  Answer:  Regular    10/18/22 1557                     Most recent vitals:   Vitals:    10/18/22 1715 10/18/22 1728 10/18/22 1739 10/18/22 1818   BP:  108/75 110/62 95/58   BP Location:  Right arm  Right arm   Patient Position:  Sitting  Lying   Pulse: 78 66 57 75   Resp:  16  16   Temp:       TempSrc:       SpO2: 95% 93% 93% 95%   Weight:       Height:           Active LDAs/IV Access:   Lines, Drains & Airways       Active LDAs       Name Placement date Placement time Site Days    Peripheral IV 10/18/22 1244 Left Antecubital 10/18/22  1244  Antecubital  less than 1                    Skin Condition:   Skin Assessments (last day)       None             Labs (abnormal labs have a star):   Labs Reviewed   RESPIRATORY PANEL PCR W/ COVID-19 (SARS-COV-2) DARIAN/ANURAG/DOV/PAD/COR/MAD/BENJI IN-HOUSE, NP SWAB IN UTM/VTP, 3-4 HR TAT - Abnormal; Notable for the following components:       Result Value    Human Rhinovirus/Enterovirus Detected (*)     All other components within normal limits    Narrative:     In the setting of a positive respiratory panel with a viral infection PLUS a negative procalcitonin without other underlying concern for bacterial infection, consider observing off antibiotics or discontinuation of antibiotics and continue supportive care. If the respiratory panel is positive for atypical bacterial infection (Bordetella pertussis, Chlamydophila pneumoniae, or Mycoplasma pneumoniae), consider antibiotic de-escalation to target atypical bacterial infection.   COMPREHENSIVE METABOLIC PANEL - Abnormal; Notable for the following components:    Glucose 128 (*)     All other components within normal limits     Narrative:     GFR Normal >60  Chronic Kidney Disease <60  Kidney Failure <15     SEDIMENTATION RATE - Abnormal; Notable for the following components:    Sed Rate 57 (*)     All other components within normal limits   CBC WITH AUTO DIFFERENTIAL - Abnormal; Notable for the following components:    WBC 12.60 (*)     All other components within normal limits    Narrative:     Modified report. Previous result was Hemogram on 10/18/2022 at 1317 EDT.  The previously reported component NRBC is no longer being reported. Previous result was 0.1 /100 WBC (Reference Range: 0.0-0.2 /100 WBC) on 10/18/2022 at 1317 EDT.   MANUAL DIFFERENTIAL - Abnormal; Notable for the following components:    Lymphocyte % 12.0 (*)     Myelocyte % 1.0 (*)     Neutrophils Absolute 9.32 (*)     Monocytes Absolute 1.26 (*)     All other components within normal limits   TROPONIN (IN-HOUSE) - Normal    Narrative:     Troponin T Reference Range:  <= 0.03 ng/mL-   Negative for AMI  >0.03 ng/mL-     Abnormal for myocardial necrosis.  Clinicians would have to utilize clinical acumen, EKG, Troponin and serial changes to determine if it is an Acute Myocardial Infarction or myocardial injury due to an underlying chronic condition.       Results may be falsely decreased if patient taking Biotin.     D-DIMER, QUANTITATIVE - Normal    Narrative:     Reference Range  --------------------------------------------------------------------     < 0.50   Negative Predictive Value  0.50-0.59   Indeterminate    >= 0.60   Probable VTE             A very low percentage of patients with DVT may yield D-Dimer results   below the cut-off of 0.50 mg/L FEU.  This is known to be more   prevalent in patients with distal DVT.             Results of this test should always be interpreted in conjunction with   the patient's medical history, clinical presentation and other   findings.  Clinical diagnosis should not be based on the result of   INNOVANCE D-Dimer alone.   BNP  "(IN-HOUSE) - Normal    Narrative:     Among patients with dyspnea, NT-proBNP is highly sensitive for the detection of acute congestive heart failure. In addition NT-proBNP of <300 pg/ml effectively rules out acute congestive heart failure with 99% negative predictive value.    Results may be falsely decreased if patient taking Biotin.     PROCALCITONIN - Normal    Narrative:     As a Marker for Sepsis (Non-Neonates):    1. <0.5 ng/mL represents a low risk of severe sepsis and/or septic shock.  2. >2 ng/mL represents a high risk of severe sepsis and/or septic shock.    As a Marker for Lower Respiratory Tract Infections that require antibiotic therapy:    PCT on Admission    Antibiotic Therapy       6-12 Hrs later    >0.5                Strongly Recommended  >0.25 - <0.5        Recommended   0.1 - 0.25          Discouraged              Remeasure/reassess PCT  <0.1                Strongly Discouraged     Remeasure/reassess PCT    As 28 day mortality risk marker: \"Change in Procalcitonin Result\" (>80% or <=80%) if Day 0 (or Day 1) and Day 4 values are available. Refer to [email or web address]    Change in PCT <=80%  A decrease of PCT levels below or equal to 80% defines a positive change in PCT test result representing a higher risk for 28-day all-cause mortality of patients diagnosed with severe sepsis for septic shock.    Change in PCT >80%  A decrease of PCT levels of more than 80% defines a negative change in PCT result representing a lower risk for 28-day all-cause mortality of patients diagnosed with severe sepsis or septic shock.      POC LACTATE - Normal   BLOOD CULTURE   BLOOD CULTURE   SCAN SLIDE   POC LACTATE   CBC AND DIFFERENTIAL    Narrative:     The following orders were created for panel order CBC & Differential.  Procedure                               Abnormality         Status                     ---------                               -----------         ------                     CBC " Auto Differential[[id number]]        Abnormal            Final result               Scan Slide[[id number]]                                       Final result                 Please view results for these tests on the individual orders.       LOC: Person, Place, Time, Situation, and      Telemetry:  Observation Unit    Cardiac Monitoring Ordered: yes    EKG:   ECG 12 Lead   Preliminary Result   HEART RATE= 74  bpm   RR Interval= 808  ms   NJ Interval= 141  ms   P Horizontal Axis= 39  deg   P Front Axis= 28  deg   QRSD Interval= 87  ms   QT Interval= 359  ms   QRS Axis= -30  deg   T Wave Axis= 22  deg   - OTHERWISE NORMAL ECG -   Sinus rhythm   Left axis deviation   ST elev, probable normal early repol pattern   Electronically Signed By:    Date and Time of Study: 2022-10-18 13:01:23      ECG 12 Lead    (Results Pending)       Medications Given in the ED:   Medications   sodium chloride 0.9 % flush 10 mL (has no administration in time range)   lactated ringers bolus 1,000 mL (0 mL Intravenous Stopped 10/18/22 1440)   acetaminophen (TYLENOL) tablet 1,000 mg (1,000 mg Oral Given 10/18/22 1315)   ondansetron (ZOFRAN) injection 4 mg (4 mg Intravenous Given 10/18/22 1310)   ketorolac (TORADOL) injection 15 mg (15 mg Intravenous Given 10/18/22 1313)   methylPREDNISolone sodium succinate (SOLU-Medrol) injection 125 mg (125 mg Intravenous Given 10/18/22 1629)   morphine injection 4 mg (4 mg Intravenous Given 10/18/22 1632)   ondansetron (ZOFRAN) injection 4 mg (4 mg Intravenous Given 10/18/22 1626)       Imaging results:  XR Chest 2 View    Result Date: 10/18/2022    1.  Mild cardiomegaly.  No acute cardia pulmonary disease.   Electronically Signed By-Chandler Conway MD On:10/18/2022 2:06 PM This report was finalized on [id number] by  Chandler Conway MD.    CT Chest Without Contrast Diagnostic    Result Date: 10/18/2022  Bilateral peripheral groundglass opacities (right lung greater than left lung), concerning for atypical  pneumonia.    Electronically Signed By-Kimberly Morel MD On:10/18/2022 3:28 PM This report was finalized on [id number] by  Kimberly Morel MD.     Social issues:   Social History     Socioeconomic History    Marital status:    Tobacco Use    Smoking status: Every Day     Packs/day: 1.00     Types: Cigarettes   Substance and Sexual Activity    Alcohol use: Not Currently    Drug use: Not Currently       NIH Stroke Scale:  Interval: (not recorded)  1a. Level of Consciousness: (not recorded)  1b. LOC Questions: (not recorded)  1c. LOC Commands: (not recorded)  2. Best Gaze: (not recorded)  3. Visual: (not recorded)  4. Facial Palsy: (not recorded)  5a. Motor Arm, Left: (not recorded)  5b. Motor Arm, Right: (not recorded)  6a. Motor Leg, Left: (not recorded)  6b. Motor Leg, Right: (not recorded)  7. Limb Ataxia: (not recorded)  8. Sensory: (not recorded)  9. Best Language: (not recorded)  10. Dysarthria: (not recorded)  11. Extinction and Inattention (formerly Neglect): (not recorded)    Total (NIH Stroke Scale): (not recorded)     Additional notable assessment information:       Nursing report ED to floor:  JOSE Garcia RN   10/18/22 18:26 EDT  Nursing report ED to floor  Roque Saabell  42 y.o.  male    HPI:   Chief Complaint   Patient presents with    Chest Pain       Admitting doctor:   Malik Lucio MD    Admitting diagnosis:   The primary encounter diagnosis was Shortness of breath. Diagnoses of Rhinovirus and Viral pneumonia were also pertinent to this visit.    Code status:   Current Code Status       Date Active Code Status Order ID Comments User Context       10/18/2022 1551 CPR (Attempt to Resuscitate) [id number]  Devika Schuler APRN ED        Question Answer    Code Status (Patient has no pulse and is not breathing) CPR (Attempt to Resuscitate)    Medical Interventions (Patient has pulse or is breathing) Full Support    Level Of Support Discussed With Patient                     Allergies:   Patient has no known allergies.    Isolation:  Enhanced Droplet/Contact      Fall Risk:  Fall Risk Assessment was completed, and patient is at low risk for falls.   Predictive Model Details         3 (Low) Factor Value    Calculated 10/18/2022 18:06 Age 42    Risk of Fall Model Musculoskeletal Assessment WDL     Active Peripheral IV Present     Imaging order in this encounter Present     Number of Distinct Medication Classes administered 6     Skin Assessment WDL     Magnesium not on file     Diastolic BP 62     Drug Use No     Tobacco Use Current     Caesar Scale not on file     Number of administrations of Anti-Rheumatics 1     Peripheral Vascular Assessment WDL     Calcium 8.9 mg/dL     Financial Class Medicaid     Sex Male     Chloride 101 mmol/L     Albumin 3.9 g/dL     Gastrointestinal Assessment WDL     Number of administrations of Analgesic Narcotics 1     Cardiac Assessment WDL     Respiratory Rate 16     Days after Admission 0.227     Total Bilirubin 0.4 mg/dL     ALT 21 U/L     Potassium 4.2 mmol/L     Creatinine 0.99 mg/dL         Weight:       10/18/22  1245   Weight: 83.9 kg (185 lb)       Intake and Output    Intake/Output Summary (Last 24 hours) at 10/18/2022 1826  Last data filed at 10/18/2022 1440  Gross per 24 hour   Intake 1000 ml   Output --   Net 1000 ml       Diet:   Dietary Orders (From admission, onward)       Start     Ordered    10/18/22 1558  Diet Regular  Diet Effective Now        Question:  Diet / Texture / Consistency  Answer:  Regular    10/18/22 1557                     Most recent vitals:   Vitals:    10/18/22 1715 10/18/22 1728 10/18/22 1739 10/18/22 1818   BP:  108/75 110/62 95/58   BP Location:  Right arm  Right arm   Patient Position:  Sitting  Lying   Pulse: 78 66 57 75   Resp:  16  16   Temp:       TempSrc:       SpO2: 95% 93% 93% 95%   Weight:       Height:           Active LDAs/IV Access:   Lines, Drains & Airways       Active LDAs       Name Placement  date Placement time Site Days    Peripheral IV 10/18/22 1244 Left Antecubital 10/18/22  1244  Antecubital  less than 1                    Skin Condition:   Skin Assessments (last day)       None             Labs (abnormal labs have a star):   Labs Reviewed   RESPIRATORY PANEL PCR W/ COVID-19 (SARS-COV-2) DARIAN/ANURAG/DOV/PAD/COR/MAD/BENJI IN-HOUSE, NP SWAB IN UTM/VTP, 3-4 HR TAT - Abnormal; Notable for the following components:       Result Value    Human Rhinovirus/Enterovirus Detected (*)     All other components within normal limits    Narrative:     In the setting of a positive respiratory panel with a viral infection PLUS a negative procalcitonin without other underlying concern for bacterial infection, consider observing off antibiotics or discontinuation of antibiotics and continue supportive care. If the respiratory panel is positive for atypical bacterial infection (Bordetella pertussis, Chlamydophila pneumoniae, or Mycoplasma pneumoniae), consider antibiotic de-escalation to target atypical bacterial infection.   COMPREHENSIVE METABOLIC PANEL - Abnormal; Notable for the following components:    Glucose 128 (*)     All other components within normal limits    Narrative:     GFR Normal >60  Chronic Kidney Disease <60  Kidney Failure <15     SEDIMENTATION RATE - Abnormal; Notable for the following components:    Sed Rate 57 (*)     All other components within normal limits   CBC WITH AUTO DIFFERENTIAL - Abnormal; Notable for the following components:    WBC 12.60 (*)     All other components within normal limits    Narrative:     Modified report. Previous result was Hemogram on 10/18/2022 at 1317 EDT.  The previously reported component NRBC is no longer being reported. Previous result was 0.1 /100 WBC (Reference Range: 0.0-0.2 /100 WBC) on 10/18/2022 at 1317 EDT.   MANUAL DIFFERENTIAL - Abnormal; Notable for the following components:    Lymphocyte % 12.0 (*)     Myelocyte % 1.0 (*)     Neutrophils Absolute 9.32 (*)      Monocytes Absolute 1.26 (*)     All other components within normal limits   TROPONIN (IN-HOUSE) - Normal    Narrative:     Troponin T Reference Range:  <= 0.03 ng/mL-   Negative for AMI  >0.03 ng/mL-     Abnormal for myocardial necrosis.  Clinicians would have to utilize clinical acumen, EKG, Troponin and serial changes to determine if it is an Acute Myocardial Infarction or myocardial injury due to an underlying chronic condition.       Results may be falsely decreased if patient taking Biotin.     D-DIMER, QUANTITATIVE - Normal    Narrative:     Reference Range  --------------------------------------------------------------------     < 0.50   Negative Predictive Value  0.50-0.59   Indeterminate    >= 0.60   Probable VTE             A very low percentage of patients with DVT may yield D-Dimer results   below the cut-off of 0.50 mg/L FEU.  This is known to be more   prevalent in patients with distal DVT.             Results of this test should always be interpreted in conjunction with   the patient's medical history, clinical presentation and other   findings.  Clinical diagnosis should not be based on the result of   INNOVANCE D-Dimer alone.   BNP (IN-HOUSE) - Normal    Narrative:     Among patients with dyspnea, NT-proBNP is highly sensitive for the detection of acute congestive heart failure. In addition NT-proBNP of <300 pg/ml effectively rules out acute congestive heart failure with 99% negative predictive value.    Results may be falsely decreased if patient taking Biotin.     PROCALCITONIN - Normal    Narrative:     As a Marker for Sepsis (Non-Neonates):    1. <0.5 ng/mL represents a low risk of severe sepsis and/or septic shock.  2. >2 ng/mL represents a high risk of severe sepsis and/or septic shock.    As a Marker for Lower Respiratory Tract Infections that require antibiotic therapy:    PCT on Admission    Antibiotic Therapy       6-12 Hrs later    >0.5                Strongly Recommended  >0.25 - <0.5  "       Recommended   0.1 - 0.25          Discouraged              Remeasure/reassess PCT  <0.1                Strongly Discouraged     Remeasure/reassess PCT    As 28 day mortality risk marker: \"Change in Procalcitonin Result\" (>80% or <=80%) if Day 0 (or Day 1) and Day 4 values are available. Refer to [email or web address]    Change in PCT <=80%  A decrease of PCT levels below or equal to 80% defines a positive change in PCT test result representing a higher risk for 28-day all-cause mortality of patients diagnosed with severe sepsis for septic shock.    Change in PCT >80%  A decrease of PCT levels of more than 80% defines a negative change in PCT result representing a lower risk for 28-day all-cause mortality of patients diagnosed with severe sepsis or septic shock.      POC LACTATE - Normal   BLOOD CULTURE   BLOOD CULTURE   SCAN SLIDE   POC LACTATE   CBC AND DIFFERENTIAL    Narrative:     The following orders were created for panel order CBC & Differential.  Procedure                               Abnormality         Status                     ---------                               -----------         ------                     CBC Auto Differential[[id number]]        Abnormal            Final result               Scan Slide[[id number]]                                       Final result                 Please view results for these tests on the individual orders.       LOC: Person, Place, Time, and Situation    Telemetry:  Observation Unit    Cardiac Monitoring Ordered: yes    EKG:   ECG 12 Lead   Preliminary Result   HEART RATE= 74  bpm   RR Interval= 808  ms   UT Interval= 141  ms   P Horizontal Axis= 39  deg   P Front Axis= 28  deg   QRSD Interval= 87  ms   QT Interval= 359  ms   QRS Axis= -30  deg   T Wave Axis= 22  deg   - OTHERWISE NORMAL ECG -   Sinus rhythm   Left axis deviation   ST elev, probable normal early repol pattern   Electronically Signed By:    Date and Time of Study: 2022-10-18 " 13:01:23      ECG 12 Lead    (Results Pending)       Medications Given in the ED:   Medications   sodium chloride 0.9 % flush 10 mL (has no administration in time range)   lactated ringers bolus 1,000 mL (0 mL Intravenous Stopped 10/18/22 1440)   acetaminophen (TYLENOL) tablet 1,000 mg (1,000 mg Oral Given 10/18/22 1315)   ondansetron (ZOFRAN) injection 4 mg (4 mg Intravenous Given 10/18/22 1310)   ketorolac (TORADOL) injection 15 mg (15 mg Intravenous Given 10/18/22 1313)   methylPREDNISolone sodium succinate (SOLU-Medrol) injection 125 mg (125 mg Intravenous Given 10/18/22 1629)   morphine injection 4 mg (4 mg Intravenous Given 10/18/22 1632)   ondansetron (ZOFRAN) injection 4 mg (4 mg Intravenous Given 10/18/22 1626)       Imaging results:  XR Chest 2 View    Result Date: 10/18/2022    1.  Mild cardiomegaly.  No acute cardia pulmonary disease.   Electronically Signed By-Chandler Conway MD On:10/18/2022 2:06 PM This report was finalized on [id number] by  Chandler Conway MD.    CT Chest Without Contrast Diagnostic    Result Date: 10/18/2022  Bilateral peripheral groundglass opacities (right lung greater than left lung), concerning for atypical pneumonia.    Electronically Signed By-Kimberly Morel MD On:10/18/2022 3:28 PM This report was finalized on [id number] by  Kimberly Morel MD.     Social issues:   Social History     Socioeconomic History    Marital status:    Tobacco Use    Smoking status: Every Day     Packs/day: 1.00     Types: Cigarettes   Substance and Sexual Activity    Alcohol use: Not Currently    Drug use: Not Currently       NIH Stroke Scale:  Interval: (not recorded)  1a. Level of Consciousness: (not recorded)  1b. LOC Questions: (not recorded)  1c. LOC Commands: (not recorded)  2. Best Gaze: (not recorded)  3. Visual: (not recorded)  4. Facial Palsy: (not recorded)  5a. Motor Arm, Left: (not recorded)  5b. Motor Arm, Right: (not recorded)  6a. Motor Leg, Left: (not recorded)  6b.  Motor Leg, Right: (not recorded)  7. Limb Ataxia: (not recorded)  8. Sensory: (not recorded)  9. Best Language: (not recorded)  10. Dysarthria: (not recorded)  11. Extinction and Inattention (formerly Neglect): (not recorded)    Total (NIH Stroke Scale): (not recorded)     Additional notable assessment information:none    Nursing report ED to floor:  JOSE Garcia RN   10/18/22 18:26 EDT        Other

## 2022-10-19 VITALS
SYSTOLIC BLOOD PRESSURE: 103 MMHG | WEIGHT: 185 LBS | TEMPERATURE: 98.9 F | RESPIRATION RATE: 18 BRPM | DIASTOLIC BLOOD PRESSURE: 62 MMHG | OXYGEN SATURATION: 98 % | BODY MASS INDEX: 29.03 KG/M2 | HEIGHT: 67 IN | HEART RATE: 68 BPM

## 2022-10-19 LAB
ANION GAP SERPL CALCULATED.3IONS-SCNC: 11 MMOL/L (ref 5–15)
BASOPHILS # BLD AUTO: 0 10*3/MM3 (ref 0–0.2)
BASOPHILS NFR BLD AUTO: 0.1 % (ref 0–1.5)
BUN SERPL-MCNC: 17 MG/DL (ref 6–20)
BUN/CREAT SERPL: 14.3 (ref 7–25)
CALCIUM SPEC-SCNC: 8.7 MG/DL (ref 8.6–10.5)
CHLORIDE SERPL-SCNC: 102 MMOL/L (ref 98–107)
CO2 SERPL-SCNC: 24 MMOL/L (ref 22–29)
CREAT SERPL-MCNC: 1.19 MG/DL (ref 0.76–1.27)
DEPRECATED RDW RBC AUTO: 45.5 FL (ref 37–54)
EGFRCR SERPLBLD CKD-EPI 2021: 77.7 ML/MIN/1.73
EOSINOPHIL # BLD AUTO: 0 10*3/MM3 (ref 0–0.4)
EOSINOPHIL NFR BLD AUTO: 0.1 % (ref 0.3–6.2)
ERYTHROCYTE [DISTWIDTH] IN BLOOD BY AUTOMATED COUNT: 14.1 % (ref 12.3–15.4)
GLUCOSE SERPL-MCNC: 176 MG/DL (ref 65–99)
HCT VFR BLD AUTO: 38.9 % (ref 37.5–51)
HGB BLD-MCNC: 13.4 G/DL (ref 13–17.7)
L PNEUMO1 AG UR QL IA: NEGATIVE
LYMPHOCYTES # BLD AUTO: 0.7 10*3/MM3 (ref 0.7–3.1)
LYMPHOCYTES NFR BLD AUTO: 8.2 % (ref 19.6–45.3)
MCH RBC QN AUTO: 32.2 PG (ref 26.6–33)
MCHC RBC AUTO-ENTMCNC: 34.3 G/DL (ref 31.5–35.7)
MCV RBC AUTO: 93.9 FL (ref 79–97)
MONOCYTES # BLD AUTO: 0.2 10*3/MM3 (ref 0.1–0.9)
MONOCYTES NFR BLD AUTO: 2 % (ref 5–12)
NEUTROPHILS NFR BLD AUTO: 7.6 10*3/MM3 (ref 1.7–7)
NEUTROPHILS NFR BLD AUTO: 89.6 % (ref 42.7–76)
NRBC BLD AUTO-RTO: 0.1 /100 WBC (ref 0–0.2)
PLATELET # BLD AUTO: 199 10*3/MM3 (ref 140–450)
PMV BLD AUTO: 10.2 FL (ref 6–12)
POTASSIUM SERPL-SCNC: 4.6 MMOL/L (ref 3.5–5.2)
RBC # BLD AUTO: 4.15 10*6/MM3 (ref 4.14–5.8)
S PNEUM AG SPEC QL LA: NEGATIVE
SODIUM SERPL-SCNC: 137 MMOL/L (ref 136–145)
TROPONIN T SERPL-MCNC: <0.01 NG/ML (ref 0–0.03)
WBC NRBC COR # BLD: 8.5 10*3/MM3 (ref 3.4–10.8)

## 2022-10-19 PROCEDURE — 80048 BASIC METABOLIC PNL TOTAL CA: CPT | Performed by: NURSE PRACTITIONER

## 2022-10-19 PROCEDURE — 94799 UNLISTED PULMONARY SVC/PX: CPT

## 2022-10-19 PROCEDURE — 87449 NOS EACH ORGANISM AG IA: CPT | Performed by: PHYSICIAN ASSISTANT

## 2022-10-19 PROCEDURE — 94664 DEMO&/EVAL PT USE INHALER: CPT

## 2022-10-19 PROCEDURE — G0378 HOSPITAL OBSERVATION PER HR: HCPCS

## 2022-10-19 PROCEDURE — 96361 HYDRATE IV INFUSION ADD-ON: CPT

## 2022-10-19 PROCEDURE — 85025 COMPLETE CBC W/AUTO DIFF WBC: CPT | Performed by: NURSE PRACTITIONER

## 2022-10-19 PROCEDURE — 84484 ASSAY OF TROPONIN QUANT: CPT | Performed by: PHYSICIAN ASSISTANT

## 2022-10-19 PROCEDURE — 63710000001 PREDNISONE PER 1 MG: Performed by: PHYSICIAN ASSISTANT

## 2022-10-19 PROCEDURE — 87899 AGENT NOS ASSAY W/OPTIC: CPT | Performed by: PHYSICIAN ASSISTANT

## 2022-10-19 RX ORDER — PREDNISONE 20 MG/1
40 TABLET ORAL
Status: DISCONTINUED | OUTPATIENT
Start: 2022-10-19 | End: 2022-10-19 | Stop reason: HOSPADM

## 2022-10-19 RX ORDER — PREDNISONE 10 MG/1
TABLET ORAL
Qty: 21 TABLET | Refills: 0 | Status: SHIPPED | OUTPATIENT
Start: 2022-10-19

## 2022-10-19 RX ORDER — ALBUTEROL SULFATE 90 UG/1
2 AEROSOL, METERED RESPIRATORY (INHALATION) EVERY 4 HOURS PRN
Qty: 18 G | Refills: 0 | Status: SHIPPED | OUTPATIENT
Start: 2022-10-19

## 2022-10-19 RX ORDER — GUAIFENESIN 600 MG/1
600 TABLET, EXTENDED RELEASE ORAL EVERY 12 HOURS SCHEDULED
Qty: 14 TABLET | Refills: 0 | Status: SHIPPED | OUTPATIENT
Start: 2022-10-19

## 2022-10-19 RX ORDER — GUAIFENESIN 600 MG/1
600 TABLET, EXTENDED RELEASE ORAL EVERY 12 HOURS SCHEDULED
Status: DISCONTINUED | OUTPATIENT
Start: 2022-10-19 | End: 2022-10-19 | Stop reason: HOSPADM

## 2022-10-19 RX ADMIN — HYDROCODONE BITARTRATE AND ACETAMINOPHEN 1 TABLET: 5; 325 TABLET ORAL at 08:53

## 2022-10-19 RX ADMIN — SENNOSIDES AND DOCUSATE SODIUM 2 TABLET: 50; 8.6 TABLET ORAL at 08:53

## 2022-10-19 RX ADMIN — IPRATROPIUM BROMIDE AND ALBUTEROL SULFATE 3 ML: .5; 3 SOLUTION RESPIRATORY (INHALATION) at 12:32

## 2022-10-19 RX ADMIN — IPRATROPIUM BROMIDE AND ALBUTEROL SULFATE 3 ML: .5; 3 SOLUTION RESPIRATORY (INHALATION) at 06:52

## 2022-10-19 RX ADMIN — PREDNISONE 40 MG: 20 TABLET ORAL at 08:53

## 2022-10-19 RX ADMIN — HYDROCODONE BITARTRATE AND ACETAMINOPHEN 1 TABLET: 5; 325 TABLET ORAL at 14:20

## 2022-10-19 RX ADMIN — GUAIFENESIN 600 MG: 600 TABLET, EXTENDED RELEASE ORAL at 14:20

## 2022-10-19 RX ADMIN — IPRATROPIUM BROMIDE AND ALBUTEROL SULFATE 3 ML: .5; 3 SOLUTION RESPIRATORY (INHALATION) at 02:39

## 2022-10-19 RX ADMIN — BENZONATATE 200 MG: 100 CAPSULE ORAL at 04:29

## 2022-10-19 RX ADMIN — HYDROCODONE BITARTRATE AND ACETAMINOPHEN 1 TABLET: 5; 325 TABLET ORAL at 04:29

## 2022-10-19 RX ADMIN — Medication 10 ML: at 08:53

## 2022-10-19 NOTE — SIGNIFICANT NOTE
Spoke with  Abdirizak concerning Smoking cessation.  He states he is working on stopping smoking on his own and down to 1 pack a day from 2+ packs a day.  I offered education and assistance but He said no thank you.

## 2022-10-19 NOTE — PLAN OF CARE
Goal Outcome Evaluation:  Plan of Care Reviewed With: patient        Progress: improving  Outcome Evaluation: Patient feeling much better today. Pt will be discharged home. continue to monitor

## 2022-10-19 NOTE — CASE MANAGEMENT/SOCIAL WORK
Discharge Planning Assessment  [facility]     Patient Name: Roque Reveles  MRN: [id number]  Today's Date: 10/19/2022    Admit Date: 10/18/2022    Plan: Home   Discharge Needs Assessment     Row Name 10/19/22 1058       Living Environment    People in Home other (see comments)  Pt reports he lives in [facility]. Other residents and staff present    Current Living Arrangements other (see comments)  's [address] on [address] in [place]    Primary Care Provided by self    Provides Primary Care For no one    Family Caregiver if Needed significant other    Quality of Family Relationships supportive;helpful    Able to Return to Prior Arrangements yes       Resource/Environmental Concerns    Resource/Environmental Concerns none    Transportation Concerns none       Transition Planning    Patient/Family Anticipates Transition to home    Patient/Family Anticipated Services at Transition none    Transportation Anticipated family or friend will provide  s/o       Discharge Needs Assessment    Equipment Currently Used at Home none    Concerns to be Addressed denies needs/concerns at this time    Anticipated Changes Related to Illness none    Equipment Needed After Discharge none               Discharge Plan     Row Name 10/19/22 1100       Plan    Plan Home    Patient/Family in Agreement with Plan yes    Plan Comments  spoke with patient. He reports he is independent with ADLs.He lives in a [facility] Community in [place]. He confirms enrollment in Meds to bed.Denies problems affording medications.Confirms he doesn't have a PCP.He isn't currently interested in  arranging a PCP appointment for him, but he was accepting of lists of local providers. He denies dc needs at this time. DC Barrier: Legionella results.              Continued Care and Services - Admitted Since 10/18/2022    Coordination has not been started for this encounter.       Expected Discharge Date and Time     Expected  Discharge Date Expected Discharge Time    Oct 19, 2022          Demographic Summary     Row Name 10/19/22 1057       General Information    Admission Type observation    Arrived From home    Referral Source admission list    Reason for Consult discharge planning    Preferred Language English       Contact Information    Permission Granted to Share Info With                Functional Status     Row Name 10/19/22 1058       Functional Status    Usual Activity Tolerance good    Current Activity Tolerance good       Functional Status, IADL    Medications independent    Meal Preparation assistive person  Pt reports staff at 's home prepare meals    Housekeeping independent    Laundry independent    Shopping independent            Antonieta Pittman RN, [facility]  Office: [phone]  Fax: [phone]  [email or web address]      I met with patient in room wearing PPE: mask and goggles,gown,gloves     Maintained distance greater than six feet and spent </=15 minutes in the room          Antonieta Pittman RN

## 2022-10-19 NOTE — DISCHARGE SUMMARY
[facility]    Provider, No Known    CHIEF COMPLAINT:     Cough, dyspnea and fever    HISTORY OF PRESENT ILLNESS:    Obtained from admitting physician HPI on 10/18/2022:  History of present illness 42-year-old male who has been sick since October 4 with cough congestion and feeling ill.  The patient had initially been seen at an urgent care center and was diagnosed with a viral infection had negative COVID-19 test.  He was treated supportively with some steroids and cough syrup.  He states he really had not improved and he developed a fever and I gave him Zithromax for possible bacterial infection.  He states that last couple days he has had a sharp pain in his chest that hurts to take a deep breath and cough is diffuse nonradiating.  He had a fever of 101 yesterday.  No vomiting or diarrhea.  Cough has been somewhat productive.  He denies any leg pain or swelling or recent long car ride plane or immobilization foreign travels and pain is severe.  Hurts worse when takes deep breath and when he coughs and moves.  There is no exertional component nothing really seems to make it better.  No one in the home with similar illness.  And currently on Zithromax.    10/19/2022:  Patient confirms the HPI noted above including several week history of cough congestion, dyspnea and malaise.  He notes that he had previously been seen at urgent care as well as this facility and had been started on a azithromycin with some mild improvement initially however no significant resolution and subsequent worsening of symptoms.  He has had an intermittent fever with a T-max on 10/17/2022 of 101.  He notes pain across the anterior portion of his chest which occurs typically with cough and deep breathing and is also tender to palpation.  Cough was previously productive of sputum but has subsequently become nonproductive.  He denies any nausea, vomiting, changes in bowel or bladder habits, peripheral edema,  lightheadedness, syncope or near syncope.  Patient does smoke less than 1 pack of cigarettes per day and does not drink alcohol.  Following his admission and treatment in the ED he does note moderate improvement and has maintained adequate oxygen saturations on room air        Past Medical History:   Diagnosis Date   • Injury of back      Past Surgical History:   Procedure Laterality Date   • APPENDECTOMY     • BACK SURGERY       History reviewed. No pertinent family history.  Social History     Tobacco Use   • Smoking status: Every Day     Packs/day: 1.00     Types: Cigarettes   Substance Use Topics   • Alcohol use: Not Currently   • Drug use: Not Currently     Medications Prior to Admission   Medication Sig Dispense Refill Last Dose   • benzonatate (TESSALON) 200 MG capsule Take 1 capsule by mouth 3 (Three) Times a Day As Needed for Cough. 20 capsule 0    • fluticasone (FLONASE) 50 MCG/ACT nasal spray 2 sprays into the nostril(s) as directed by provider Daily. 9.9 mL 0      Allergies:  Patient has no known allergies.      There is no immunization history on file for this patient.        REVIEW OF SYSTEMS:    Review of Systems   Constitutional: Positive for fever and malaise/fatigue.   HENT: Negative.    Eyes: Negative.    Cardiovascular: Positive for chest pain.   Respiratory: Positive for cough, shortness of breath and sputum production.    Skin: Negative.    Musculoskeletal: Negative.    Gastrointestinal: Negative.    Genitourinary: Negative.    Neurological: Negative.    Psychiatric/Behavioral: Negative.        Vital Signs  Temp:  [98.2 °F (36.8 °C)-98.9 °F (37.2 °C)] 98.9 °F (37.2 °C)  Heart Rate:  [56-83] 68  Resp:  [14-18] 18  BP: ()/(58-84) 103/62          Physical Exam:  Physical Exam  Vitals reviewed.   Constitutional:       General: He is not in acute distress.     Appearance: Normal appearance. He is normal weight. He is not ill-appearing, toxic-appearing or diaphoretic.   HENT:      Head:  Normocephalic.      Right Ear: External ear normal.      Left Ear: External ear normal.      Nose: Nose normal.      Mouth/Throat:      Mouth: Mucous membranes are moist.   Eyes:      Extraocular Movements: Extraocular movements intact.   Cardiovascular:      Rate and Rhythm: Normal rate and regular rhythm.      Pulses: Normal pulses.      Heart sounds: Normal heart sounds.   Pulmonary:      Effort: Pulmonary effort is normal.      Breath sounds: Wheezing present.      Comments: Mild wheeze noted in bases with bases also being somewhat diminished  Abdominal:      General: Bowel sounds are normal.      Palpations: Abdomen is soft.      Tenderness: There is no abdominal tenderness.   Musculoskeletal:         General: Normal range of motion.      Cervical back: Normal range of motion.      Right lower leg: No edema.      Left lower leg: No edema.      Comments: Chest wall tender to palpation   Skin:     General: Skin is warm and dry.      Capillary Refill: Capillary refill takes less than 2 seconds.   Neurological:      General: No focal deficit present.      Mental Status: He is alert and oriented to person, place, and time.   Psychiatric:         Mood and Affect: Mood normal.         Behavior: Behavior normal.         Thought Content: Thought content normal.         Judgment: Judgment normal.         Emotional Behavior:   Normal   Debilities:  None  Results Review:    I reviewed the patient's new clinical results.  Lab Results (most recent)     Procedure Component Value Units Date/Time    Troponin [[id number]]  (Normal) Collected: 10/19/22 0442    Specimen: Blood Updated: 10/19/22 0726     Troponin T <0.010 ng/mL     Narrative:      Troponin T Reference Range:  <= 0.03 ng/mL-   Negative for AMI  >0.03 ng/mL-     Abnormal for myocardial necrosis.  Clinicians would have to utilize clinical acumen, EKG, Troponin and serial changes to determine if it is an Acute Myocardial Infarction or myocardial injury due to an  underlying chronic condition.       Results may be falsely decreased if patient taking Biotin.      CBC Auto Differential [[id number]]  (Abnormal) Collected: 10/19/22 0442    Specimen: Blood Updated: 10/19/22 0556     WBC 8.50 10*3/mm3      RBC 4.15 10*6/mm3      Hemoglobin 13.4 g/dL      Hematocrit 38.9 %      MCV 93.9 fL      MCH 32.2 pg      MCHC 34.3 g/dL      RDW 14.1 %      RDW-SD 45.5 fl      MPV 10.2 fL      Platelets 199 10*3/mm3      Neutrophil % 89.6 %      Lymphocyte % 8.2 %      Monocyte % 2.0 %      Eosinophil % 0.1 %      Basophil % 0.1 %      Neutrophils, Absolute 7.60 10*3/mm3      Lymphocytes, Absolute 0.70 10*3/mm3      Monocytes, Absolute 0.20 10*3/mm3      Eosinophils, Absolute 0.00 10*3/mm3      Basophils, Absolute 0.00 10*3/mm3      nRBC 0.1 /100 WBC     Basic Metabolic Panel [[id number]]  (Abnormal) Collected: 10/19/22 0442    Specimen: Blood Updated: 10/19/22 0537     Glucose 176 mg/dL      BUN 17 mg/dL      Creatinine 1.19 mg/dL      Sodium 137 mmol/L      Potassium 4.6 mmol/L      Chloride 102 mmol/L      CO2 24.0 mmol/L      Calcium 8.7 mg/dL      BUN/Creatinine Ratio 14.3     Anion Gap 11.0 mmol/L      eGFR 77.7 mL/min/1.73      Comment: National Kidney Foundation and American Society of Nephrology (ASN) Task Force recommended calculation based on the Chronic Kidney Disease Epidemiology Collaboration (CKD-EPI) equation refit without adjustment for race.       Narrative:      GFR Normal >60  Chronic Kidney Disease <60  Kidney Failure <15      Blood Culture - Blood, Arm, Right [[id number]] Collected: 10/18/22 1439    Specimen: Blood from Arm, Right Updated: 10/18/22 1443    Respiratory Panel PCR w/COVID-19(SARS-CoV-2) DARIAN/ANURAG/DOV/PAD/COR/MAD/BENJI In-House, NP Swab in UTM/VTM, 3-4 HR TAT - Swab, Nasopharynx [[id number]]  (Abnormal) Collected: 10/18/22 1310    Specimen: Swab from Nasopharynx Updated: 10/18/22 1414     ADENOVIRUS, PCR Not Detected     Coronavirus 229E Not Detected     Coronavirus  "HKU1 Not Detected     Coronavirus NL63 Not Detected     Coronavirus OC43 Not Detected     COVID19 Not Detected     Human Metapneumovirus Not Detected     Human Rhinovirus/Enterovirus Detected     Influenza A PCR Not Detected     Influenza B PCR Not Detected     Parainfluenza Virus 1 Not Detected     Parainfluenza Virus 2 Not Detected     Parainfluenza Virus 3 Not Detected     Parainfluenza Virus 4 Not Detected     RSV, PCR Not Detected     Bordetella pertussis pcr Not Detected     Bordetella parapertussis PCR Not Detected     Chlamydophila pneumoniae PCR Not Detected     Mycoplasma pneumo by PCR Not Detected    Narrative:      In the setting of a positive respiratory panel with a viral infection PLUS a negative procalcitonin without other underlying concern for bacterial infection, consider observing off antibiotics or discontinuation of antibiotics and continue supportive care. If the respiratory panel is positive for atypical bacterial infection (Bordetella pertussis, Chlamydophila pneumoniae, or Mycoplasma pneumoniae), consider antibiotic de-escalation to target atypical bacterial infection.    Procalcitonin [[id number]]  (Normal) Collected: 10/18/22 1307    Specimen: Blood Updated: 10/18/22 1348     Procalcitonin 0.06 ng/mL     Narrative:      As a Marker for Sepsis (Non-Neonates):    1. <0.5 ng/mL represents a low risk of severe sepsis and/or septic shock.  2. >2 ng/mL represents a high risk of severe sepsis and/or septic shock.    As a Marker for Lower Respiratory Tract Infections that require antibiotic therapy:    PCT on Admission    Antibiotic Therapy       6-12 Hrs later    >0.5                Strongly Recommended  >0.25 - <0.5        Recommended   0.1 - 0.25          Discouraged              Remeasure/reassess PCT  <0.1                Strongly Discouraged     Remeasure/reassess PCT    As 28 day mortality risk marker: \"Change in Procalcitonin Result\" (>80% or <=80%) if Day 0 (or Day 1) and Day 4 values are " available. Refer to http://www.[facility]-pct-calculator.com    Change in PCT <=80%  A decrease of PCT levels below or equal to 80% defines a positive change in PCT test result representing a higher risk for 28-day all-cause mortality of patients diagnosed with severe sepsis for septic shock.    Change in PCT >80%  A decrease of PCT levels of more than 80% defines a negative change in PCT result representing a lower risk for 28-day all-cause mortality of patients diagnosed with severe sepsis or septic shock.       Comprehensive Metabolic Panel [[id number]]  (Abnormal) Collected: 10/18/22 1307    Specimen: Blood Updated: 10/18/22 1345     Glucose 128 mg/dL      BUN 18 mg/dL      Creatinine 0.99 mg/dL      Sodium 138 mmol/L      Potassium 4.2 mmol/L      Chloride 101 mmol/L      CO2 24.0 mmol/L      Calcium 8.9 mg/dL      Total Protein 7.2 g/dL      Albumin 3.90 g/dL      ALT (SGPT) 21 U/L      AST (SGOT) 19 U/L      Alkaline Phosphatase 86 U/L      Total Bilirubin 0.4 mg/dL      Globulin 3.3 gm/dL      A/G Ratio 1.2 g/dL      BUN/Creatinine Ratio 18.2     Anion Gap 13.0 mmol/L      eGFR 96.9 mL/min/1.73      Comment: National Kidney Foundation and American Society of Nephrology (ASN) Task Force recommended calculation based on the Chronic Kidney Disease Epidemiology Collaboration (CKD-EPI) equation refit without adjustment for race.       Narrative:      GFR Normal >60  Chronic Kidney Disease <60  Kidney Failure <15      Troponin [[id number]]  (Normal) Collected: 10/18/22 1307    Specimen: Blood Updated: 10/18/22 1345     Troponin T <0.010 ng/mL     Narrative:      Troponin T Reference Range:  <= 0.03 ng/mL-   Negative for AMI  >0.03 ng/mL-     Abnormal for myocardial necrosis.  Clinicians would have to utilize clinical acumen, EKG, Troponin and serial changes to determine if it is an Acute Myocardial Infarction or myocardial injury due to an underlying chronic condition.       Results may be falsely decreased if patient  taking Biotin.      BNP [[id number]]  (Normal) Collected: 10/18/22 1307    Specimen: Blood Updated: 10/18/22 1342     proBNP 66.1 pg/mL     Narrative:      Among patients with dyspnea, NT-proBNP is highly sensitive for the detection of acute congestive heart failure. In addition NT-proBNP of <300 pg/ml effectively rules out acute congestive heart failure with 99% negative predictive value.    Results may be falsely decreased if patient taking Biotin.      Manual Differential [[id number]]  (Abnormal) Collected: 10/18/22 1307    Specimen: Blood Updated: 10/18/22 1330     Neutrophil % 71.0 %      Lymphocyte % 12.0 %      Monocyte % 10.0 %      Eosinophil % 3.0 %      Bands %  3.0 %      Myelocyte % 1.0 %      Neutrophils Absolute 9.32 10*3/mm3      Lymphocytes Absolute 1.51 10*3/mm3      Monocytes Absolute 1.26 10*3/mm3      Eosinophils Absolute 0.38 10*3/mm3      RBC Morphology Normal     WBC Morphology Normal     Platelet Morphology Normal    CBC & Differential [[id number]]  (Abnormal) Collected: 10/18/22 1307    Specimen: Blood Updated: 10/18/22 1330    Narrative:      The following orders were created for panel order CBC & Differential.  Procedure                               Abnormality         Status                     ---------                               -----------         ------                     CBC Auto Differential[[id number]]        Abnormal            Final result               Scan Slide[[id number]]                                       Final result                 Please view results for these tests on the individual orders.    CBC Auto Differential [[id number]]  (Abnormal) Collected: 10/18/22 1307    Specimen: Blood Updated: 10/18/22 1330     WBC 12.60 10*3/mm3      RBC 4.84 10*6/mm3      Hemoglobin 14.7 g/dL      Hematocrit 44.1 %      MCV 91.1 fL      MCH 30.3 pg      MCHC 33.2 g/dL      RDW 13.9 %      RDW-SD 44.2 fl      MPV 9.3 fL      Platelets 223 10*3/mm3     Narrative:      Modified report.  Previous result was Hemogram on 10/18/2022 at 1317 EDT.  The previously reported component NRBC is no longer being reported. Previous result was 0.1 /100 WBC (Reference Range: 0.0-0.2 /100 WBC) on 10/18/2022 at 1317 EDT.    Scan Slide [[id number]] Collected: 10/18/22 1307    Specimen: Blood Updated: 10/18/22 1330     Scan Slide --     Comment: See Manual Differential Results       D-dimer, Quantitative [[id number]]  (Normal) Collected: 10/18/22 1307    Specimen: Blood Updated: 10/18/22 1326     D-Dimer, Quantitative <0.19 mg/L (FEU)     Narrative:      Reference Range  --------------------------------------------------------------------     < 0.50   Negative Predictive Value  0.50-0.59   Indeterminate    >= 0.60   Probable VTE             A very low percentage of patients with DVT may yield D-Dimer results   below the cut-off of 0.50 mg/L FEU.  This is known to be more   prevalent in patients with distal DVT.             Results of this test should always be interpreted in conjunction with   the patient's medical history, clinical presentation and other   findings.  Clinical diagnosis should not be based on the result of   INNOVANCE D-Dimer alone.    Sedimentation Rate [[id number]]  (Abnormal) Collected: 10/18/22 1307    Specimen: Blood Updated: 10/18/22 1320     Sed Rate 57 mm/hr     Blood Culture - Blood, Arm, Left [[id number]] Collected: 10/18/22 1307    Specimen: Blood from Arm, Left Updated: 10/18/22 1311    POC Lactate [[id number]]  (Normal) Collected: 10/18/22 1309    Specimen: Blood Updated: 10/18/22 1310     Lactate 1.1 mmol/L      Comment: Serial Number: [id number]:  [id number]             Imaging Results (Most Recent)     Procedure Component Value Units Date/Time    CT Chest Without Contrast Diagnostic [[id number]] Collected: 10/18/22 1524     Updated: 10/18/22 1530    Narrative:      CT CHEST WO CONTRAST DIAGNOSTIC-     Date of Exam: 10/18/2022 3:12 PM     Indication: Chest pain, short of breath,  elevated sed rate, negative  D-dimer, rule out pneumonia, pericardial effusion.     Comparison: Two-view chest x-ray 10/18/2022.     Technique: Serial and axial CT images of the chest were obtained.  Reconstructions in the coronal and sagittal planes were performed.   Automated exposure control and iterative reconstruction methods were  used.     FINDINGS:  There are peripheral groundglass opacities in right upper lobe, right  middle lobe, right lower lobe, and left lower lobe (right lung greater  than left lung). No focal consolidation is seen. There is no pleural or  pericardial effusion. Heart size is not enlarged. No significant  coronary artery calcifications are noted. No lymphadenopathy is seen in  the chest. 10 mm hypodense left hepatic lesion near the dome is likely a  cyst. Remaining partially included solid organs of the upper abdomen  appear within normal limits for noncontrast CT. No acute or worrisome  osseous abnormality is identified.       Impression:      Bilateral peripheral groundglass opacities (right lung greater than left  lung), concerning for atypical pneumonia.           Electronically Signed By-Kimberly Morel MD On:10/18/2022 3:28 PM  This report was finalized on [id number] by  Kimberly Morel MD.    XR Chest 2 View [[id number]] Collected: 10/18/22 1405     Updated: 10/18/22 1408    Narrative:      XR CHEST 2 VW-     Date of Exam: 10/18/2022 1:35 PM     Indication: Cough congestion fever COVID-negative today.     Comparison: None available.     Technique: Two views of the chest were obtained.     FINDINGS:  There is mild cardio Dariela.  Lung volumes are low.  Lungs appear clear.   No pleural effusion or pneumothorax.  There are no pleural effusions.   There is no evidence pneumothorax.  The bony thorax is unremarkable.          Impression:            1.  Mild cardiomegaly.  No acute cardia pulmonary disease.        Electronically Signed By-Chandler Conway MD On:10/18/2022 2:06 PM  This  report was finalized on [id number] by  Chandler Conway MD.        reviewed    ECG/EMG Results (most recent)     Procedure Component Value Units Date/Time    ECG 12 Lead [[id number]] Collected: 10/18/22 1301     Updated: 10/18/22 1302     QT Interval 359 ms     Narrative:      HEART RATE= 74  bpm  RR Interval= 808  ms  WV Interval= 141  ms  P Horizontal Axis= 39  deg  P Front Axis= 28  deg  QRSD Interval= 87  ms  QT Interval= 359  ms  QRS Axis= -30  deg  T Wave Axis= 22  deg  - OTHERWISE NORMAL ECG -  Sinus rhythm  Left axis deviation  ST elev, probable normal early repol pattern  Electronically Signed By:   Date and Time of Study: 2022-10-18 13:01:23    SCANNED - TELEMETRY   [[id number]] Resulted: 10/18/22     Updated: 10/19/22 1228        reviewed            Microbiology Results (last 10 days)     Procedure Component Value - Date/Time    S. Pneumo Ag Urine or CSF - Urine, Urine, Clean Catch [[id number]]  (Normal) Collected: 10/19/22 1055    Lab Status: Final result Specimen: Urine, Clean Catch Updated: 10/19/22 1141     Strep Pneumo Ag Negative    Legionella Antigen, Urine - Urine, Urine, Clean Catch [[id number]]  (Normal) Collected: 10/19/22 1055    Lab Status: Final result Specimen: Urine, Clean Catch Updated: 10/19/22 1141     LEGIONELLA ANTIGEN, URINE Negative    Respiratory Panel PCR w/COVID-19(SARS-CoV-2) DARIAN/ANURAG/DOV/PAD/COR/MAD/BENJI In-House, NP Swab in UTM/VTM, 3-4 HR TAT - Swab, Nasopharynx [[id number]]  (Abnormal) Collected: 10/18/22 1310    Lab Status: Final result Specimen: Swab from Nasopharynx Updated: 10/18/22 1414     ADENOVIRUS, PCR Not Detected     Coronavirus 229E Not Detected     Coronavirus HKU1 Not Detected     Coronavirus NL63 Not Detected     Coronavirus OC43 Not Detected     COVID19 Not Detected     Human Metapneumovirus Not Detected     Human Rhinovirus/Enterovirus Detected     Influenza A PCR Not Detected     Influenza B PCR Not Detected     Parainfluenza Virus 1 Not Detected      Parainfluenza Virus 2 Not Detected     Parainfluenza Virus 3 Not Detected     Parainfluenza Virus 4 Not Detected     RSV, PCR Not Detected     Bordetella pertussis pcr Not Detected     Bordetella parapertussis PCR Not Detected     Chlamydophila pneumoniae PCR Not Detected     Mycoplasma pneumo by PCR Not Detected    Narrative:      In the setting of a positive respiratory panel with a viral infection PLUS a negative procalcitonin without other underlying concern for bacterial infection, consider observing off antibiotics or discontinuation of antibiotics and continue supportive care. If the respiratory panel is positive for atypical bacterial infection (Bordetella pertussis, Chlamydophila pneumoniae, or Mycoplasma pneumoniae), consider antibiotic de-escalation to target atypical bacterial infection.    Blood Culture - Blood, Arm, Left [[id number]]  (Normal) Collected: 10/18/22 1307    Lab Status: Preliminary result Specimen: Blood from Arm, Left Updated: 10/19/22 1316     Blood Culture No growth at 24 hours          Assessment & Plan     Shortness of breath       Dyspnea and cough  -Serial troponin less than 0.010  -proBNP: 66.1  -WBCs: 12.60 with an increased absolute neutrophil and monocyte count noted on differential which trended to 8.50 on the morning following admission  -Procalcitonin: 0.06  -D-dimer: Less than 0.19  -Lactate: 1.1  -Chest x-ray shows mild cardiomegaly with no acute cardiopulmonary process  -CT of chest shows bilateral peripheral groundglass opacities right greater than left concerning for atypical pneumonia  -Respiratory viral panel positive for rhinovirus infection  -Urine antigens for strep pneumo and Legionella  -Patient given IV Solu-Medrol in the ED, continue p.o. prednisone  -Levaquin started in the ED, will be discontinued given no obvious bacterial etiology and likely viral cause of symptoms  -Albuterol  -Continue Tessalon and add Mucinex    Tobacco abuse  -Encourage cessation    I  discussed the patients findings and my recommendations with patient and nursing staff.     Discharge Diagnosis:      Shortness of breath      Hospital Course  Patient is a 42 y.o. male presented with dyspnea and cough with an HPI noted above.  Patient did also note some chest pain occurring with cough and deep breathing with chest also noted is tender to palpation.  Serial troponins were assessed and found to be less than 0.010 with proBNP noted within normal limits at 66.1.  A leukocytosis of 12.60 was noted on admission with an increased absolute neutrophil and monocyte count noted on differential.  WBCs trended to 8.50 on the morning following admission with a procalcitonin of 0.06 and a lactate of 1.1.  D-dimer was within normal limits at less than 0.19.  Chest x-ray was obtained in the ED which showed mild cardiomegaly with no acute cardiopulmonary process and CT of chest showed bilateral peripheral groundglass opacities right greater than left concerning for an atypical pneumonia.  Respiratory viral panel was obtained which was positive for rhinovirus infection.  Urine antigens for strep pneumo and Legionella were ordered and resulted as negative.  He was given IV Solu-Medrol in the ED and was also started on Levaquin.  IV steroids were subsequently changed to p.o. prednisone and he was continued on as needed breathing treatments as well as Tessalon and Mucinex was added.  Patient noted significant improvement though continued fatigue and nonproductive cough on the morning following admission.  Patient has maintained adequate oxygen saturations on room air throughout his admission.  At this time patient is felt to be in good condition for discharge with close follow-up with his PCP on an outpatient basis.  His full testing/results and plan were discussed with patient along with concerning/alarm symptoms for which to call 911/return to the ED. a prednisone taper as well as albuterol inhaler and Mucinex will be  ordered at discharge.  All questions were answered he verbalizes his understanding and agreement.  Past Medical History:     Past Medical History:   Diagnosis Date   • Injury of back        Past Surgical History:     Past Surgical History:   Procedure Laterality Date   • APPENDECTOMY     • BACK SURGERY         Social History:   Social History     Socioeconomic History   • Marital status:    Tobacco Use   • Smoking status: Every Day     Packs/day: 1.00     Types: Cigarettes   Substance and Sexual Activity   • Alcohol use: Not Currently   • Drug use: Not Currently       Procedures Performed         Consults:   Consults     No orders found for last 30 day(s).          Condition on Discharge:     Stable    Discharge Disposition  Home or Self Care    Discharge Medications     Discharge Medications      New Medications      Instructions Start Date   albuterol sulfate  (90 Base) MCG/ACT inhaler  Commonly known as: PROVENTIL HFA;VENTOLIN HFA;PROAIR HFA   2 puffs, Inhalation, Every 4 Hours PRN      guaiFENesin 600 MG 12 hr tablet  Commonly known as: MUCINEX   600 mg, Oral, Every 12 Hours Scheduled      predniSONE 10 MG tablet  Commonly known as: DELTASONE   Take 6 tabs by mouth on day 1, 5 on day 2, 4 on day 3, 3 on day 4, 2 on day 5, 1 on day 6         Continue These Medications      Instructions Start Date   benzonatate 200 MG capsule  Commonly known as: TESSALON   200 mg, Oral, 3 Times Daily PRN      fluticasone 50 MCG/ACT nasal spray  Commonly known as: FLONASE   2 sprays, Nasal, Daily             Discharge Diet:     Activity at Discharge:   Activity Instructions     Work Restrictions      Type of Restriction: Work    May Return to Work: Specific Date    Return To Work Date: 10/24/2022    With / Without Restrictions: Without Restrictions          Follow-up Appointments  No future appointments.  Additional Instructions for the Follow-ups that You Need to Schedule     Discharge Follow-up with PCP   As  directed       Currently Documented PCP:    Provider, No Known    PCP Phone Number:    [phone]     Follow Up Details: 5 to 7 days               Test Results Pending at Discharge  Pending Labs     Order Current Status    Blood Culture - Blood, Arm, Right In process    Blood Culture - Blood, Arm, Left Preliminary result           Risk for Readmission (LACE) Score: 1 (10/19/2022  6:00 AM)          Brooks Wilson PA-C  10/19/22  13:59 EDT

## 2022-10-19 NOTE — PLAN OF CARE
Goal Outcome Evaluation:  Plan of Care Reviewed With: patient        Progress: no change  Outcome Evaluation: New admission from ED with rhinovirus and viral pneumonia. VSS, patient remains afebrile. Patient continues to have loose, productive cough. IVF, IV abx and breathing treatments continue per MD order. Will monitor.

## 2022-10-20 NOTE — CASE MANAGEMENT/SOCIAL WORK
Case Management Discharge Note                Selected Continued Care - Discharged on 10/19/2022 Admission date: 10/18/2022 - Discharge disposition: Home or Self Care                Transportation Services  Private: Car    Final Discharge Disposition Code: 01 - home or self-care

## 2022-10-23 LAB
BACTERIA SPEC AEROBE CULT: NORMAL
BACTERIA SPEC AEROBE CULT: NORMAL

## 2022-10-24 ENCOUNTER — HOSPITAL ENCOUNTER (EMERGENCY)
Facility: HOSPITAL | Age: 42
Discharge: HOME | End: 2022-10-25
Attending: EMERGENCY MEDICINE | Admitting: EMERGENCY MEDICINE

## 2022-10-24 ENCOUNTER — APPOINTMENT (OUTPATIENT)
Dept: GENERAL RADIOLOGY | Facility: HOSPITAL | Age: 42
End: 2022-10-24

## 2022-10-24 DIAGNOSIS — R06.02 SHORTNESS OF BREATH: Primary | ICD-10-CM

## 2022-10-24 DIAGNOSIS — E86.0 DEHYDRATION: ICD-10-CM

## 2022-10-24 DIAGNOSIS — J20.6 ACUTE BRONCHITIS DUE TO RHINOVIRUS: ICD-10-CM

## 2022-10-24 DIAGNOSIS — R05.1 ACUTE COUGH: ICD-10-CM

## 2022-10-24 LAB
ALBUMIN SERPL-MCNC: 3.9 G/DL (ref 3.5–5.2)
ALBUMIN/GLOB SERPL: 1.2 G/DL
ALP SERPL-CCNC: 96 U/L (ref 39–117)
ALT SERPL W P-5'-P-CCNC: 66 U/L (ref 1–41)
ANION GAP SERPL CALCULATED.3IONS-SCNC: 12 MMOL/L (ref 5–15)
ANISOCYTOSIS BLD QL: ABNORMAL
AST SERPL-CCNC: 29 U/L (ref 1–40)
BILIRUB SERPL-MCNC: 0.5 MG/DL (ref 0–1.2)
BUN SERPL-MCNC: 17 MG/DL (ref 6–20)
BUN/CREAT SERPL: 17.2 (ref 7–25)
CALCIUM SPEC-SCNC: 8.9 MG/DL (ref 8.6–10.5)
CHLORIDE SERPL-SCNC: 102 MMOL/L (ref 98–107)
CO2 SERPL-SCNC: 24 MMOL/L (ref 22–29)
CREAT SERPL-MCNC: 0.99 MG/DL (ref 0.76–1.27)
D DIMER PPP FEU-MCNC: <0.19 MG/L (FEU) (ref 0–0.59)
DEPRECATED RDW RBC AUTO: 45.5 FL (ref 37–54)
EGFRCR SERPLBLD CKD-EPI 2021: 96.9 ML/MIN/1.73
ERYTHROCYTE [DISTWIDTH] IN BLOOD BY AUTOMATED COUNT: 14.1 % (ref 12.3–15.4)
GLOBULIN UR ELPH-MCNC: 3.3 GM/DL
GLUCOSE SERPL-MCNC: 112 MG/DL (ref 65–99)
HCT VFR BLD AUTO: 47.7 % (ref 37.5–51)
HGB BLD-MCNC: 15.6 G/DL (ref 13–17.7)
LYMPHOCYTES # BLD MANUAL: 3.38 10*3/MM3 (ref 0.7–3.1)
LYMPHOCYTES NFR BLD MANUAL: 5 % (ref 5–12)
MCH RBC QN AUTO: 30.4 PG (ref 26.6–33)
MCHC RBC AUTO-ENTMCNC: 32.7 G/DL (ref 31.5–35.7)
MCV RBC AUTO: 93 FL (ref 79–97)
MONOCYTES # BLD: 0.63 10*3/MM3 (ref 0.1–0.9)
NEUTROPHILS # BLD AUTO: 8.5 10*3/MM3 (ref 1.7–7)
NEUTROPHILS NFR BLD MANUAL: 68 % (ref 42.7–76)
NT-PROBNP SERPL-MCNC: 10 PG/ML (ref 0–450)
PLAT MORPH BLD: NORMAL
PLATELET # BLD AUTO: 289 10*3/MM3 (ref 140–450)
PMV BLD AUTO: 8.9 FL (ref 6–12)
POIKILOCYTOSIS BLD QL SMEAR: ABNORMAL
POTASSIUM SERPL-SCNC: 3.9 MMOL/L (ref 3.5–5.2)
PROCALCITONIN SERPL-MCNC: 0.05 NG/ML (ref 0–0.25)
PROT SERPL-MCNC: 7.2 G/DL (ref 6–8.5)
RBC # BLD AUTO: 5.13 10*6/MM3 (ref 4.14–5.8)
SCAN SLIDE: NORMAL
SODIUM SERPL-SCNC: 138 MMOL/L (ref 136–145)
TROPONIN T SERPL-MCNC: <0.01 NG/ML (ref 0–0.03)
TROPONIN T SERPL-MCNC: <0.01 NG/ML (ref 0–0.03)
VARIANT LYMPHS NFR BLD MANUAL: 27 % (ref 19.6–45.3)
WBC MORPH BLD: NORMAL
WBC NRBC COR # BLD: 12.5 10*3/MM3 (ref 3.4–10.8)

## 2022-10-24 PROCEDURE — 84145 PROCALCITONIN (PCT): CPT | Performed by: PHYSICIAN ASSISTANT

## 2022-10-24 PROCEDURE — 83880 ASSAY OF NATRIURETIC PEPTIDE: CPT | Performed by: PHYSICIAN ASSISTANT

## 2022-10-24 PROCEDURE — 80053 COMPREHEN METABOLIC PANEL: CPT | Performed by: PHYSICIAN ASSISTANT

## 2022-10-24 PROCEDURE — 85007 BL SMEAR W/DIFF WBC COUNT: CPT | Performed by: PHYSICIAN ASSISTANT

## 2022-10-24 PROCEDURE — 25010000002 KETOROLAC TROMETHAMINE PER 15 MG: Performed by: PHYSICIAN ASSISTANT

## 2022-10-24 PROCEDURE — 93005 ELECTROCARDIOGRAM TRACING: CPT | Performed by: EMERGENCY MEDICINE

## 2022-10-24 PROCEDURE — 99283 EMERGENCY DEPT VISIT LOW MDM: CPT

## 2022-10-24 PROCEDURE — 96374 THER/PROPH/DIAG INJ IV PUSH: CPT

## 2022-10-24 PROCEDURE — 87040 BLOOD CULTURE FOR BACTERIA: CPT | Performed by: PHYSICIAN ASSISTANT

## 2022-10-24 PROCEDURE — 85025 COMPLETE CBC W/AUTO DIFF WBC: CPT | Performed by: PHYSICIAN ASSISTANT

## 2022-10-24 PROCEDURE — 85379 FIBRIN DEGRADATION QUANT: CPT | Performed by: PHYSICIAN ASSISTANT

## 2022-10-24 PROCEDURE — 84484 ASSAY OF TROPONIN QUANT: CPT | Performed by: PHYSICIAN ASSISTANT

## 2022-10-24 PROCEDURE — 36415 COLL VENOUS BLD VENIPUNCTURE: CPT

## 2022-10-24 PROCEDURE — 93005 ELECTROCARDIOGRAM TRACING: CPT

## 2022-10-24 PROCEDURE — 71045 X-RAY EXAM CHEST 1 VIEW: CPT

## 2022-10-24 RX ORDER — SODIUM CHLORIDE 0.9 % (FLUSH) 0.9 %
10 SYRINGE (ML) INJECTION AS NEEDED
Status: DISCONTINUED | OUTPATIENT
Start: 2022-10-24 | End: 2022-10-25 | Stop reason: HOSPADM

## 2022-10-24 RX ORDER — LIDOCAINE 50 MG/G
1 PATCH TOPICAL ONCE
Status: DISCONTINUED | OUTPATIENT
Start: 2022-10-24 | End: 2022-10-25 | Stop reason: HOSPADM

## 2022-10-24 RX ORDER — GUAIFENESIN AND CODEINE PHOSPHATE 100; 10 MG/5ML; MG/5ML
10 SOLUTION ORAL ONCE
Status: COMPLETED | OUTPATIENT
Start: 2022-10-24 | End: 2022-10-25

## 2022-10-24 RX ORDER — KETOROLAC TROMETHAMINE 30 MG/ML
30 INJECTION, SOLUTION INTRAMUSCULAR; INTRAVENOUS ONCE
Status: COMPLETED | OUTPATIENT
Start: 2022-10-24 | End: 2022-10-24

## 2022-10-24 RX ORDER — LIDOCAINE 50 MG/G
1 PATCH TOPICAL EVERY 24 HOURS
Qty: 30 EACH | Refills: 0 | Status: SHIPPED | OUTPATIENT
Start: 2022-10-24

## 2022-10-24 RX ADMIN — SODIUM CHLORIDE 500 ML: 9 INJECTION, SOLUTION INTRAVENOUS at 20:52

## 2022-10-24 RX ADMIN — KETOROLAC TROMETHAMINE 30 MG: 30 INJECTION, SOLUTION INTRAMUSCULAR at 20:52

## 2022-10-25 VITALS
HEIGHT: 67 IN | BODY MASS INDEX: 28.96 KG/M2 | WEIGHT: 184.53 LBS | OXYGEN SATURATION: 98 % | DIASTOLIC BLOOD PRESSURE: 91 MMHG | RESPIRATION RATE: 18 BRPM | SYSTOLIC BLOOD PRESSURE: 126 MMHG | HEART RATE: 104 BPM | TEMPERATURE: 98.5 F

## 2022-10-25 RX ADMIN — GUAIFENESIN AND CODEINE PHOSPHATE 10 ML: 10; 100 LIQUID ORAL at 00:03

## 2022-10-25 RX ADMIN — LIDOCAINE 1 PATCH: 50 PATCH CUTANEOUS at 00:03

## 2022-10-29 LAB
BACTERIA SPEC AEROBE CULT: NORMAL
BACTERIA SPEC AEROBE CULT: NORMAL

## 2022-10-30 LAB — QT INTERVAL: 322 MS

## 2024-04-02 ENCOUNTER — HOSPITAL ENCOUNTER (EMERGENCY)
Facility: HOSPITAL | Age: 44
Discharge: HOME | End: 2024-04-02
Attending: STUDENT IN AN ORGANIZED HEALTH CARE EDUCATION/TRAINING PROGRAM | Admitting: STUDENT IN AN ORGANIZED HEALTH CARE EDUCATION/TRAINING PROGRAM
Payer: MEDICAID

## 2024-04-02 ENCOUNTER — APPOINTMENT (OUTPATIENT)
Dept: CT IMAGING | Facility: HOSPITAL | Age: 44
End: 2024-04-02
Payer: MEDICAID

## 2024-04-02 VITALS
HEIGHT: 67 IN | HEART RATE: 76 BPM | TEMPERATURE: 96.4 F | BODY MASS INDEX: 31.39 KG/M2 | SYSTOLIC BLOOD PRESSURE: 122 MMHG | RESPIRATION RATE: 18 BRPM | OXYGEN SATURATION: 95 % | DIASTOLIC BLOOD PRESSURE: 85 MMHG | WEIGHT: 200 LBS

## 2024-04-02 DIAGNOSIS — M54.16 LUMBAR RADICULOPATHY: Primary | ICD-10-CM

## 2024-04-02 DIAGNOSIS — R29.818 NEUROGENIC CLAUDICATION: ICD-10-CM

## 2024-04-02 LAB
ANION GAP SERPL CALCULATED.3IONS-SCNC: 9.7 MMOL/L (ref 5–15)
BASOPHILS # BLD AUTO: 0.03 10*3/MM3 (ref 0–0.2)
BASOPHILS NFR BLD AUTO: 0.4 % (ref 0–1.5)
BILIRUB UR QL STRIP: NEGATIVE
BUN SERPL-MCNC: 14 MG/DL (ref 6–20)
BUN/CREAT SERPL: 10 (ref 7–25)
CALCIUM SPEC-SCNC: 9 MG/DL (ref 8.6–10.5)
CHLORIDE SERPL-SCNC: 107 MMOL/L (ref 98–107)
CLARITY UR: CLEAR
CO2 SERPL-SCNC: 24.3 MMOL/L (ref 22–29)
COLOR UR: YELLOW
CREAT SERPL-MCNC: 1.4 MG/DL (ref 0.76–1.27)
DEPRECATED RDW RBC AUTO: 48.6 FL (ref 37–54)
EGFRCR SERPLBLD CKD-EPI 2021: 63.2 ML/MIN/1.73
EOSINOPHIL # BLD AUTO: 0.22 10*3/MM3 (ref 0–0.4)
EOSINOPHIL NFR BLD AUTO: 2.8 % (ref 0.3–6.2)
ERYTHROCYTE [DISTWIDTH] IN BLOOD BY AUTOMATED COUNT: 14.2 % (ref 12.3–15.4)
GLUCOSE SERPL-MCNC: 104 MG/DL (ref 65–99)
GLUCOSE UR STRIP-MCNC: NEGATIVE MG/DL
HCT VFR BLD AUTO: 41.6 % (ref 37.5–51)
HGB BLD-MCNC: 13.7 G/DL (ref 13–17.7)
HGB UR QL STRIP.AUTO: NEGATIVE
IMM GRANULOCYTES # BLD AUTO: 0.04 10*3/MM3 (ref 0–0.05)
IMM GRANULOCYTES NFR BLD AUTO: 0.5 % (ref 0–0.5)
KETONES UR QL STRIP: NEGATIVE
LEUKOCYTE ESTERASE UR QL STRIP.AUTO: NEGATIVE
LYMPHOCYTES # BLD AUTO: 2.26 10*3/MM3 (ref 0.7–3.1)
LYMPHOCYTES NFR BLD AUTO: 28.9 % (ref 19.6–45.3)
MCH RBC QN AUTO: 30.6 PG (ref 26.6–33)
MCHC RBC AUTO-ENTMCNC: 32.9 G/DL (ref 31.5–35.7)
MCV RBC AUTO: 93.1 FL (ref 79–97)
MONOCYTES # BLD AUTO: 0.74 10*3/MM3 (ref 0.1–0.9)
MONOCYTES NFR BLD AUTO: 9.5 % (ref 5–12)
NEUTROPHILS NFR BLD AUTO: 4.53 10*3/MM3 (ref 1.7–7)
NEUTROPHILS NFR BLD AUTO: 57.9 % (ref 42.7–76)
NITRITE UR QL STRIP: NEGATIVE
NRBC BLD AUTO-RTO: 0 /100 WBC (ref 0–0.2)
PH UR STRIP.AUTO: 5.5 [PH] (ref 5–8)
PLATELET # BLD AUTO: 191 10*3/MM3 (ref 140–450)
PMV BLD AUTO: 11 FL (ref 6–12)
POTASSIUM SERPL-SCNC: 4 MMOL/L (ref 3.5–5.2)
PROT UR QL STRIP: NEGATIVE
RBC # BLD AUTO: 4.47 10*6/MM3 (ref 4.14–5.8)
SODIUM SERPL-SCNC: 141 MMOL/L (ref 136–145)
SP GR UR STRIP: 1.02 (ref 1–1.03)
UROBILINOGEN UR QL STRIP: NORMAL
WBC NRBC COR # BLD AUTO: 7.82 10*3/MM3 (ref 3.4–10.8)

## 2024-04-02 PROCEDURE — 99284 EMERGENCY DEPT VISIT MOD MDM: CPT

## 2024-04-02 PROCEDURE — 63710000001 PREDNISONE PER 1 MG: Performed by: STUDENT IN AN ORGANIZED HEALTH CARE EDUCATION/TRAINING PROGRAM

## 2024-04-02 PROCEDURE — 85025 COMPLETE CBC W/AUTO DIFF WBC: CPT | Performed by: STUDENT IN AN ORGANIZED HEALTH CARE EDUCATION/TRAINING PROGRAM

## 2024-04-02 PROCEDURE — 25010000002 MORPHINE PER 10 MG: Performed by: STUDENT IN AN ORGANIZED HEALTH CARE EDUCATION/TRAINING PROGRAM

## 2024-04-02 PROCEDURE — 25810000003 LACTATED RINGERS SOLUTION: Performed by: STUDENT IN AN ORGANIZED HEALTH CARE EDUCATION/TRAINING PROGRAM

## 2024-04-02 PROCEDURE — 80048 BASIC METABOLIC PNL TOTAL CA: CPT | Performed by: STUDENT IN AN ORGANIZED HEALTH CARE EDUCATION/TRAINING PROGRAM

## 2024-04-02 PROCEDURE — 72131 CT LUMBAR SPINE W/O DYE: CPT

## 2024-04-02 PROCEDURE — 81003 URINALYSIS AUTO W/O SCOPE: CPT | Performed by: STUDENT IN AN ORGANIZED HEALTH CARE EDUCATION/TRAINING PROGRAM

## 2024-04-02 PROCEDURE — 96374 THER/PROPH/DIAG INJ IV PUSH: CPT

## 2024-04-02 RX ORDER — HYDROCODONE BITARTRATE AND ACETAMINOPHEN 7.5; 325 MG/1; MG/1
1 TABLET ORAL ONCE
Status: COMPLETED | OUTPATIENT
Start: 2024-04-02 | End: 2024-04-02

## 2024-04-02 RX ORDER — HYDROCODONE BITARTRATE AND ACETAMINOPHEN 5; 325 MG/1; MG/1
1-2 TABLET ORAL EVERY 6 HOURS PRN
Qty: 15 TABLET | Refills: 0 | Status: SHIPPED | OUTPATIENT
Start: 2024-04-02 | End: 2024-04-03

## 2024-04-02 RX ORDER — METHYLPREDNISOLONE 4 MG/1
TABLET ORAL
Qty: 21 TABLET | Refills: 0 | Status: SHIPPED | OUTPATIENT
Start: 2024-04-02 | End: 2024-04-03

## 2024-04-02 RX ORDER — MORPHINE SULFATE 2 MG/ML
4 INJECTION, SOLUTION INTRAMUSCULAR; INTRAVENOUS ONCE
Status: COMPLETED | OUTPATIENT
Start: 2024-04-02 | End: 2024-04-02

## 2024-04-02 RX ORDER — METHOCARBAMOL 500 MG/1
500 TABLET, FILM COATED ORAL 4 TIMES DAILY PRN
Qty: 30 TABLET | Refills: 0 | Status: SHIPPED | OUTPATIENT
Start: 2024-04-02 | End: 2024-04-03

## 2024-04-02 RX ORDER — PREDNISONE 20 MG/1
60 TABLET ORAL ONCE
Status: COMPLETED | OUTPATIENT
Start: 2024-04-02 | End: 2024-04-02

## 2024-04-02 RX ADMIN — SODIUM CHLORIDE, POTASSIUM CHLORIDE, SODIUM LACTATE AND CALCIUM CHLORIDE 1000 ML: 600; 310; 30; 20 INJECTION, SOLUTION INTRAVENOUS at 21:44

## 2024-04-02 RX ADMIN — HYDROCODONE BITARTRATE AND ACETAMINOPHEN 1 TABLET: 7.5; 325 TABLET ORAL at 20:42

## 2024-04-02 RX ADMIN — PREDNISONE 60 MG: 20 TABLET ORAL at 20:42

## 2024-04-02 RX ADMIN — MORPHINE SULFATE 4 MG: 2 INJECTION, SOLUTION INTRAMUSCULAR; INTRAVENOUS at 21:44

## 2024-04-02 NOTE — Clinical Note
[facility] EMERGENCY DEPARTMENT  [address] [facility] [address]  Phone: [phone]    Roque Reveles was seen and treated in our emergency department on 4/2/2024.  He may return to work on 04/05/2024.         Thank you for choosing [facility].    Micha Johnson MD

## 2024-04-02 NOTE — Clinical Note
[facility] EMERGENCY DEPARTMENT  [address] [facility] [address]  Phone: [phone]    Roque Reveles was seen and treated in our emergency department on 4/2/2024.  He may return to work on 04/05/2024.         Thank you for choosing [facility].    Micha Johnson MD

## 2024-04-03 RX ORDER — HYDROCODONE BITARTRATE AND ACETAMINOPHEN 5; 325 MG/1; MG/1
1-2 TABLET ORAL EVERY 6 HOURS PRN
Qty: 15 TABLET | Refills: 0 | Status: SHIPPED | OUTPATIENT
Start: 2024-04-03

## 2024-04-03 RX ORDER — METHYLPREDNISOLONE 4 MG/1
TABLET ORAL
Qty: 21 TABLET | Refills: 0 | Status: SHIPPED | OUTPATIENT
Start: 2024-04-03

## 2024-04-03 RX ORDER — METHOCARBAMOL 500 MG/1
500 TABLET, FILM COATED ORAL 4 TIMES DAILY PRN
Qty: 30 TABLET | Refills: 0 | Status: SHIPPED | OUTPATIENT
Start: 2024-04-03

## 2024-04-03 NOTE — ED PROVIDER NOTES
EMERGENCY DEPARTMENT ENCOUNTER    Room Number:  14/14  PCP: Provider, No Known  History obtained from: Patient      HPI:  Chief Complaint: Low back pain  A complete HPI/ROS/PMH/PSH/SH/FH are unobtainable due to: N/A  Context: Roque Reveles is a 44 y.o. male who presents to the ED c/o low back pain.  Worsening over the last several weeks, radiates down his left leg.  Worse with sitting up straight.  Also worse with trying to stand.  Remains ambulatory.  No incontinence of bowel or bladder.  No saddle anesthesia.  No fevers or chills.  No known injury.            PAST MEDICAL HISTORY  Active Ambulatory Problems     Diagnosis Date Noted    Shortness of breath 10/18/2022     Resolved Ambulatory Problems     Diagnosis Date Noted    No Resolved Ambulatory Problems     Past Medical History:   Diagnosis Date    Injury of back          PAST SURGICAL HISTORY  Past Surgical History:   Procedure Laterality Date    APPENDECTOMY      BACK SURGERY           FAMILY HISTORY  History reviewed. No pertinent family history.      SOCIAL HISTORY  Social History     Socioeconomic History    Marital status:    Tobacco Use    Smoking status: Every Day     Current packs/day: 1.00     Types: Cigarettes   Substance and Sexual Activity    Alcohol use: Not Currently    Drug use: Not Currently         ALLERGIES  Patient has no known allergies.        REVIEW OF SYSTEMS    As per HPI      PHYSICAL EXAM  ED Triage Vitals   Temp Heart Rate Resp BP SpO2   04/02/24 2015 04/02/24 2015 04/02/24 2015 04/02/24 2018 04/02/24 2015   96.4 °F (35.8 °C) 100 18 (!) 154/101 99 %      Temp src Heart Rate Source Patient Position BP Location FiO2 (%)   -- -- -- -- --              Physical Exam  Constitutional:       General: He is not in acute distress.  HENT:      Head: Normocephalic and atraumatic.   Cardiovascular:      Rate and Rhythm: Normal rate and regular rhythm.   Pulmonary:      Effort: No respiratory distress.   Abdominal:      General: There is  no distension.      Tenderness: There is no abdominal tenderness.   Musculoskeletal:         General: No swelling or deformity.      Comments: Tenderness palpation over the lumbar spine without step-off or deformity   Skin:     General: Skin is warm and dry.   Neurological:      General: No focal deficit present.      Mental Status: He is alert. Mental status is at baseline.           Vital signs and nursing notes reviewed.          LAB RESULTS  Recent Results (from the past 24 hour(s))   Basic Metabolic Panel    Collection Time: 04/02/24  8:42 PM    Specimen: Blood   Result Value Ref Range    Glucose 104 (H) 65 - 99 mg/dL    BUN 14 6 - 20 mg/dL    Creatinine 1.40 (H) 0.76 - 1.27 mg/dL    Sodium 141 136 - 145 mmol/L    Potassium 4.0 3.5 - 5.2 mmol/L    Chloride 107 98 - 107 mmol/L    CO2 24.3 22.0 - 29.0 mmol/L    Calcium 9.0 8.6 - 10.5 mg/dL    BUN/Creatinine Ratio 10.0 7.0 - 25.0    Anion Gap 9.7 5.0 - 15.0 mmol/L    eGFR 63.2 >60.0 mL/min/1.73   CBC Auto Differential    Collection Time: 04/02/24  8:42 PM    Specimen: Blood   Result Value Ref Range    WBC 7.82 3.40 - 10.80 10*3/mm3    RBC 4.47 4.14 - 5.80 10*6/mm3    Hemoglobin 13.7 13.0 - 17.7 g/dL    Hematocrit 41.6 37.5 - 51.0 %    MCV 93.1 79.0 - 97.0 fL    MCH 30.6 26.6 - 33.0 pg    MCHC 32.9 31.5 - 35.7 g/dL    RDW 14.2 12.3 - 15.4 %    RDW-SD 48.6 37.0 - 54.0 fl    MPV 11.0 6.0 - 12.0 fL    Platelets 191 140 - 450 10*3/mm3    Neutrophil % 57.9 42.7 - 76.0 %    Lymphocyte % 28.9 19.6 - 45.3 %    Monocyte % 9.5 5.0 - 12.0 %    Eosinophil % 2.8 0.3 - 6.2 %    Basophil % 0.4 0.0 - 1.5 %    Immature Grans % 0.5 0.0 - 0.5 %    Neutrophils, Absolute 4.53 1.70 - 7.00 10*3/mm3    Lymphocytes, Absolute 2.26 0.70 - 3.10 10*3/mm3    Monocytes, Absolute 0.74 0.10 - 0.90 10*3/mm3    Eosinophils, Absolute 0.22 0.00 - 0.40 10*3/mm3    Basophils, Absolute 0.03 0.00 - 0.20 10*3/mm3    Immature Grans, Absolute 0.04 0.00 - 0.05 10*3/mm3    nRBC 0.0 0.0 - 0.2 /100 WBC    Urinalysis With Microscopic If Indicated (No Culture) - Urine, Clean Catch    Collection Time: 04/02/24  8:43 PM    Specimen: Urine, Clean Catch   Result Value Ref Range    Color, UA Yellow Yellow, Straw    Appearance, UA Clear Clear    pH, UA 5.5 5.0 - 8.0    Specific Gravity, UA 1.018 1.005 - 1.030    Glucose, UA Negative Negative    Ketones, UA Negative Negative    Bilirubin, UA Negative Negative    Blood, UA Negative Negative    Protein, UA Negative Negative    Leuk Esterase, UA Negative Negative    Nitrite, UA Negative Negative    Urobilinogen, UA 0.2 E.U./dL 0.2 - 1.0 E.U./dL       Ordered the above labs and reviewed the results.        RADIOLOGY  CT Lumbar Spine Without Contrast    Result Date: 4/2/2024  CT OF THE LUMBAR SPINE  HISTORY: Worsening low back pain  COMPARISON: August 15, 2017  TECHNIQUE: Axial CT imaging was obtained through the lumbar spine. Coronal and sagittal reformatted images were obtained.  FINDINGS: No acute fracture or subluxation of the lumbar spine is identified. Lumbar vertebral body alignment appears within normal limits. There is some intervertebral disc space narrowing at L5-S1. This appears similar to prior imaging. There are some minimal aortoiliac calcifications. There is colonic diverticulosis. No acute abnormalities noted within the visualized soft tissues.  T12-L1: There is no canal stenosis or neuroforaminal narrowing. L1-L2: There is mild diffuse disc bulge, with some flattening of the thecal sac. There is no significant canal stenosis or neuroforaminal narrowing. L2-L3: There is mild disc bulge, without significant canal stenosis or neuroforaminal narrowing. L3-L4: There is diffuse disc bulge. There is some minimal canal narrowing, as well as mild bilateral neuroforaminal narrowing. L4-L5: There is diffuse disc bulge. There is moderate canal stenosis, which is exacerbated by some hypertrophy of the ligamentum flavum. There is moderate bilateral neuroforaminal narrowing.  L5-S1: There is diffuse disc bulge, although more significant on the left. There is some mild canal narrowing. However, there is severe neuroforaminal narrowing on the left.      Degenerative changes, as noted above.  Radiation dose reduction techniques were utilized, including automated exposure control and exposure modulation based on body size.   This report was finalized on 4/2/2024 9:23 PM by Dr. Nidhi Grace M.D on Workstation: [id number]       Ordered the above noted radiological studies. Reviewed by me in PACS.              MEDICATIONS GIVEN IN ER  Medications   lactated ringers bolus 1,000 mL (has no administration in time range)   morphine injection 4 mg (has no administration in time range)   HYDROcodone-acetaminophen (NORCO) 7.5-325 MG per tablet 1 tablet (1 tablet Oral Given 4/2/24 2042)   predniSONE (DELTASONE) tablet 60 mg (60 mg Oral Given 4/2/24 2042)               MEDICAL DECISION MAKING, PROGRESS, and CONSULTS    MDM: Patient presented emergency department with acute on chronic low back pain, otherwise well-appearing, vitals otherwise stable.  Labs significant for mild elevation in creatinine, suspect secondary to ibuprofen use.  Given IV fluids in the ER.  Imaging demonstrating multiple level degenerative changes with severe canal stenosis on the left.  Suspect this may be contributing to some of his symptoms.  Given dose of pain medication and steroids in the emergency department.  Will place on multiple medications with neurosurgery referral.  Given return precautions and discharged home.  Chong reviewed.    All labs have been independently reviewed by me.  All radiology studies have been reviewed by me and I have also reviewed the radiology report.   EKG's independently viewed and interpreted by me.  Discussion below represents my analysis of pertinent findings related to patient's condition, differential diagnosis, treatment plan and final disposition.      Additional sources:  -  Discussed/ obtained information from independent historians:      - External (non-ED) record review:     - Chronic or social conditions impacting care: History of low back pain    - Shared decision making: Discussed plan for discharge with close follow-up, patient agrees      Orders placed during this visit:  Orders Placed This Encounter   Procedures    CT Lumbar Spine Without Contrast    Basic Metabolic Panel    CBC Auto Differential    Urinalysis With Microscopic If Indicated (No Culture) - Urine, Clean Catch    Ambulatory Referral to Neurosurgery    Measure post void residual    CBC & Differential         Additional orders considered but not ordered:  Considered MRI however patient has normal postvoid residual and no signs to suggest cauda equina syndrome at this time.        Differential diagnosis includes but is not limited to:    Fracture, disc herniation, neurogenic claudication, spinal stenosis, spinal epidural abscess, cauda equina syndrome      Independent interpretation of labs, radiology studies, and discussions with consultants:  ED Course as of 04/02/24 2144   Tue Apr 02, 2024 2141 CT lumbar interpreted myself:  No fracture or deformity [FS]      ED Course User Index  [FS] Micha Johnson MD           DIAGNOSIS  Final diagnoses:   Lumbar radiculopathy   Neurogenic claudication         DISPOSITION  Discharged home        Latest Documented Vital Signs:  As of 21:40 EDT  BP- 139/99 HR- 86 Temp- 96.4 °F (35.8 °C) O2 sat- 94%              --    Please note that portions of this were completed with a voice recognition program.       Note Disclaimer: At [facility], we believe that sharing information builds trust and better relationships. You are receiving this note because you are receiving care at [facility] or recently visited. It is possible you will see health information before a provider has talked with you about it. This kind of information can be easy to misunderstand. To help you fully  understand what it means for your health, we urge you to discuss this note with your provider.             Micha Johnson MD  04/02/24 0699

## 2024-04-10 NOTE — PROGRESS NOTES
Subjective     Chief Complaint   Patient presents with    Back Pain     Discectomy in 2010         Previous Treatment: Lumbar microdiscectomy in 2010, Norco 5, steroid pack    HPI: Roque Reveles is a 44 y.o. male with a history of prior lumbar microdiscectomy who presents with complaints of severe low back and left leg pain.  The symptoms have been going on for several years but have notably worsened over the past 3 weeks after he was lifting a heavy object.  He describes severe pain throughout his low back that radiates down his left leg.  He also has numbness and tingling down his left leg.  Symptoms are worse with activity and better with rest.  He denies lower extremity weakness.  He has not undergone formal physical therapy recently.        PMH:  Past Medical History:   Diagnosis Date    Injury of back          Current Outpatient Medications:     albuterol sulfate  (90 Base) MCG/ACT inhaler, Inhale 2 puffs Every 4 (Four) Hours As Needed for Wheezing. (Patient not taking: Reported on 4/15/2024), Disp: 18 g, Rfl: 0    benzonatate (TESSALON) 200 MG capsule, Take 1 capsule by mouth 3 (Three) Times a Day As Needed for Cough. (Patient not taking: Reported on 4/15/2024), Disp: 20 capsule, Rfl: 0    fluticasone (FLONASE) 50 MCG/ACT nasal spray, 2 sprays into the nostril(s) as directed by provider Daily. (Patient not taking: Reported on 4/15/2024), Disp: 9.9 mL, Rfl: 0    guaiFENesin (MUCINEX) 600 MG 12 hr tablet, Take 1 tablet by mouth Every 12 (Twelve) Hours. (Patient not taking: Reported on 4/15/2024), Disp: 14 tablet, Rfl: 0    HYDROcodone-acetaminophen (NORCO) 5-325 MG per tablet, Take 1-2 tablets by mouth Every 6 (Six) Hours As Needed for Severe Pain. (Patient not taking: Reported on 4/15/2024), Disp: 15 tablet, Rfl: 0    lidocaine (LIDODERM) 5 %, Place 1 patch on the skin as directed by provider Daily. Remove & Discard patch within 12 hours or as directed by MD (Patient not taking: Reported on 4/15/2024),  "Disp: 30 each, Rfl: 0    meloxicam (MOBIC) 15 MG tablet, Take 1 tablet by mouth Daily., Disp: 30 tablet, Rfl: 1    methocarbamol (ROBAXIN) 750 MG tablet, Take 1 tablet by mouth 4 (Four) Times a Day As Needed for Muscle Spasms., Disp: 60 tablet, Rfl: 0    methylPREDNISolone (MEDROL) 4 MG dose pack, Take as directed on package instructions. (Patient not taking: Reported on 4/15/2024), Disp: 21 tablet, Rfl: 0    predniSONE (DELTASONE) 10 MG tablet, Take 6 tabs by mouth once daily day 1, 5 on day 2, 4 on day 3, 3 on day 4, 2 on day 5, 1 on day 6 (Patient not taking: Reported on 4/15/2024), Disp: 21 tablet, Rfl: 0     No Known Allergies     Past Surgical History:   Procedure Laterality Date    APPENDECTOMY      BACK SURGERY          History reviewed. No pertinent family history.      Social Hx:  Social History     Tobacco Use   Smoking Status Every Day    Current packs/day: 1.00    Types: Cigarettes   Smokeless Tobacco Not on file      Alcohol Use: Not At Risk (10/18/2022)    AUDIT-C     Frequency of Alcohol Consumption: Never     Average Number of Drinks: Patient does not drink     Frequency of Binge Drinking: Never      Social History     Substance and Sexual Activity   Drug Use Not Currently          Review of Systems   Constitutional:  Positive for activity change.   HENT: Negative.     Eyes: Negative.    Respiratory: Negative.     Cardiovascular: Negative.    Gastrointestinal: Negative.    Endocrine: Negative.    Genitourinary: Negative.    Musculoskeletal:  Positive for arthralgias, back pain and myalgias.   Skin: Negative.    Allergic/Immunologic: Negative.    Neurological:  Positive for weakness and numbness (+tingling from middle of back to left leg).        Sharp stabbing pain    Hematological: Negative.    Psychiatric/Behavioral:  Positive for sleep disturbance.          Objective     /99   Pulse 115   Resp 18   Ht 170.2 cm (67\")   Wt 92.1 kg (203 lb)   SpO2 97%   BMI 31.79 kg/m²    Body mass index " is 31.79 kg/m².      Physical Exam  Vitals reviewed.   Constitutional:       General: He is not in acute distress.     Appearance: Normal appearance. He is well-developed and well-groomed.   HENT:      Head: Normocephalic and atraumatic.   Eyes:      Extraocular Movements: Extraocular movements intact.      Pupils: Pupils are equal, round, and reactive to light.   Cardiovascular:      Rate and Rhythm: Normal rate and regular rhythm.      Pulses: Normal pulses.   Pulmonary:      Effort: Pulmonary effort is normal. No respiratory distress.   Musculoskeletal:         General: No swelling or tenderness. Normal range of motion.      Lumbar back: Tenderness present.   Skin:     General: Skin is warm and dry.      Findings: No bruising or rash.   Neurological:      General: No focal deficit present.      Mental Status: He is alert and oriented to person, place, and time.      Sensory: Sensation is intact.      Motor: Motor function is intact.      Deep Tendon Reflexes:      Reflex Scores:       Patellar reflexes are 2+ on the right side and 2+ on the left side.       Achilles reflexes are 2+ on the right side and 2+ on the left side.     Comments:             Neurological Exam  Mental Status  Alert. Oriented to person, place, and time.    Cranial Nerves  CN III, IV, VI: Extraocular movements intact bilaterally. Pupils equal round and reactive to light bilaterally.    Motor  Normal muscle bulk throughout. No fasciculations present. Normal muscle tone. Strength is 5/5 in all four extremities except as noted.                                             Right                     Left   Iliopsoas                               5                          5-   Quadriceps                           5                          5-   Gastrocnemius                     5                           5-   Anterior tibialis                      5                          5-    Sensory  Normal sensation.Light touch is normal in upper and lower  extremities. Pinprick is normal in upper and lower extremities.     Reflexes  Deep tendon reflexes are 2+ and symmetric except as noted.                                            Right                      Left  Patellar                                2+                         2+  Achilles                                2+                         2+    Gait  Casual gait is normal including stance, stride, and arm swing.          Results Review  I personally reviewed and interpreted the images from the following studies:    CT lumbar spine without contrast  Severe degenerative changes at L5-S1 with severe left-sided neuroforaminal stenosis.    Assessment & Plan     MDM: Roque Reveles is a 44 y.o. male with a prior lumbar microdiscectomy in 2010 who presents with chronic low back pain and left-sided radiculopathy that acutely worsened 3 weeks ago after some heavy lifting.  He has severe left-sided neuroforaminal stenosis at L5-S1 seen on CT lumbar spine.  He is neurologically intact on exam.    After discussing multiple treatment options with the patient we will proceed with physical therapy and medication management.  I recommend physical therapy with focus on core strengthening and flexibility.  Will trial him on once daily Mobic as well as Robaxin for muscle spasms.  Discussed potential adverse effects of these medications, the most common of which are GI upset and drowsiness, respectively.  I am also ordering flexion-extension studies to rule out dynamic instability given his history of prior hemilaminectomy.  I will see patient again once he completes physical therapy.  Patient is agreeable to this plan.       Diagnosis Plan   1. DDD (degenerative disc disease), lumbosacral  Ambulatory Referral to Physical Therapy Evaluate and treat; Heat, Electrotherapy; Soft Tissue Mobilizaton; Stretching, Strengthening    XR Spine Lumbar Complete With Flex & Ext    methocarbamol (ROBAXIN) 750 MG tablet    meloxicam (MOBIC)  15 MG tablet      2. Lumbosacral radiculopathy  Ambulatory Referral to Physical Therapy Evaluate and treat; Heat, Electrotherapy; Soft Tissue Mobilizaton; Stretching, Strengthening    XR Spine Lumbar Complete With Flex & Ext    methocarbamol (ROBAXIN) 750 MG tablet    meloxicam (MOBIC) 15 MG tablet      3. Mechanical low back pain  Ambulatory Referral to Physical Therapy Evaluate and treat; Heat, Electrotherapy; Soft Tissue Mobilizaton; Stretching, Strengthening    XR Spine Lumbar Complete With Flex & Ext    methocarbamol (ROBAXIN) 750 MG tablet    meloxicam (MOBIC) 15 MG tablet          Return in about 8 weeks (around 6/10/2024).      Roque Reveles  reports that he has been smoking cigarettes. He does not have any smokeless tobacco history on file.          BMI is >= 30 and <35. (Class 1 Obesity). The following options were offered after discussion;: exercise counseling/recommendations and nutrition counseling/recommendations         This patient was examined wearing appropriate personal protective equipment.            Kleber Mccoy PA-C    04/21/24  12:22 EDT      Part of this note may be an electronic transcription/translation of spoken language to printed text using the Dragon Dictation System.

## 2024-04-15 ENCOUNTER — OFFICE VISIT (OUTPATIENT)
Dept: NEUROSURGERY | Facility: CLINIC | Age: 44
End: 2024-04-15
Payer: MEDICAID

## 2024-04-15 VITALS
WEIGHT: 203 LBS | HEIGHT: 67 IN | BODY MASS INDEX: 31.86 KG/M2 | OXYGEN SATURATION: 97 % | DIASTOLIC BLOOD PRESSURE: 99 MMHG | RESPIRATION RATE: 18 BRPM | HEART RATE: 115 BPM | SYSTOLIC BLOOD PRESSURE: 161 MMHG

## 2024-04-15 DIAGNOSIS — M54.17 LUMBOSACRAL RADICULOPATHY: ICD-10-CM

## 2024-04-15 DIAGNOSIS — M51.37 DDD (DEGENERATIVE DISC DISEASE), LUMBOSACRAL: Primary | ICD-10-CM

## 2024-04-15 DIAGNOSIS — M54.59 MECHANICAL LOW BACK PAIN: ICD-10-CM

## 2024-04-15 RX ORDER — MELOXICAM 15 MG/1
15 TABLET ORAL DAILY
Qty: 30 TABLET | Refills: 1 | Status: SHIPPED | OUTPATIENT
Start: 2024-04-15

## 2024-04-15 RX ORDER — METHOCARBAMOL 750 MG/1
750 TABLET, FILM COATED ORAL 4 TIMES DAILY PRN
Qty: 60 TABLET | Refills: 0 | Status: SHIPPED | OUTPATIENT
Start: 2024-04-15

## 2024-04-17 ENCOUNTER — PATIENT ROUNDING (BHMG ONLY) (OUTPATIENT)
Dept: NEUROSURGERY | Facility: CLINIC | Age: 44
End: 2024-04-17
Payer: MEDICAID

## 2024-04-18 ENCOUNTER — HOSPITAL ENCOUNTER (OUTPATIENT)
Dept: GENERAL RADIOLOGY | Facility: HOSPITAL | Age: 44
Discharge: HOME | End: 2024-04-18
Payer: MEDICAID

## 2024-04-18 DIAGNOSIS — M54.17 LUMBOSACRAL RADICULOPATHY: ICD-10-CM

## 2024-04-18 DIAGNOSIS — M51.37 DDD (DEGENERATIVE DISC DISEASE), LUMBOSACRAL: ICD-10-CM

## 2024-04-18 DIAGNOSIS — M54.59 MECHANICAL LOW BACK PAIN: ICD-10-CM

## 2024-04-18 PROCEDURE — 72114 X-RAY EXAM L-S SPINE BENDING: CPT

## 2024-04-19 ENCOUNTER — TELEPHONE (OUTPATIENT)
Dept: NEUROSURGERY | Facility: CLINIC | Age: 44
End: 2024-04-19
Payer: MEDICAID

## 2024-04-19 DIAGNOSIS — M54.17 LUMBOSACRAL RADICULOPATHY: Primary | ICD-10-CM

## 2024-04-19 NOTE — TELEPHONE ENCOUNTER
Patient called in and states that the mobic he is taken is not helping with pain and causing him to have diarrhea  and upset stomach.I stated I would get the message to the provider.

## 2024-04-22 NOTE — TELEPHONE ENCOUNTER
PATIENT CALLED BACK AND STATED THAT HE IS NOT TAKING THE DELTASONE SO I DID TELL HIM THAT PER SHWETHA UMAÑA A MEDROL DOSEPACK WILL BE CALLED IN TO HIS  PHARMACY.  HE UNDERSTOOD.

## 2024-04-22 NOTE — TELEPHONE ENCOUNTER
CALLED PATIENT LM ASKING IF HE IS STILL TAKING DELTASONE ASKED PATIENT TO PLEASE CALL BACK AND LET US KNOW      HUB OK TO GET INFO FROM PATIENT

## 2024-04-23 ENCOUNTER — HOSPITAL ENCOUNTER (OUTPATIENT)
Facility: HOSPITAL | Age: 44
Setting detail: OBSERVATION
Discharge: HOME | End: 2024-04-24
Attending: EMERGENCY MEDICINE | Admitting: EMERGENCY MEDICINE
Payer: MEDICAID

## 2024-04-23 ENCOUNTER — APPOINTMENT (OUTPATIENT)
Dept: MRI IMAGING | Facility: HOSPITAL | Age: 44
End: 2024-04-23
Payer: MEDICAID

## 2024-04-23 DIAGNOSIS — R15.9 INCONTINENCE OF FECES, UNSPECIFIED FECAL INCONTINENCE TYPE: ICD-10-CM

## 2024-04-23 DIAGNOSIS — M54.16 LUMBAR RADICULOPATHY: ICD-10-CM

## 2024-04-23 DIAGNOSIS — R73.9 HYPERGLYCEMIA: ICD-10-CM

## 2024-04-23 DIAGNOSIS — R70.0 ELEVATED SED RATE: ICD-10-CM

## 2024-04-23 DIAGNOSIS — M54.17 LUMBOSACRAL RADICULOPATHY: ICD-10-CM

## 2024-04-23 DIAGNOSIS — M54.42 ACUTE MIDLINE LOW BACK PAIN WITH LEFT-SIDED SCIATICA: Primary | ICD-10-CM

## 2024-04-23 DIAGNOSIS — M51.37 DDD (DEGENERATIVE DISC DISEASE), LUMBOSACRAL: ICD-10-CM

## 2024-04-23 DIAGNOSIS — M54.59 MECHANICAL LOW BACK PAIN: ICD-10-CM

## 2024-04-23 PROBLEM — M54.50 LOWER BACK PAIN: Status: ACTIVE | Noted: 2024-04-23

## 2024-04-23 LAB
ANION GAP SERPL CALCULATED.3IONS-SCNC: 15 MMOL/L (ref 5–15)
BASOPHILS # BLD AUTO: 0.02 10*3/MM3 (ref 0–0.2)
BASOPHILS NFR BLD AUTO: 0.2 % (ref 0–1.5)
BUN SERPL-MCNC: 12 MG/DL (ref 6–20)
BUN/CREAT SERPL: 11.2 (ref 7–25)
CALCIUM SPEC-SCNC: 9.5 MG/DL (ref 8.6–10.5)
CHLORIDE SERPL-SCNC: 103 MMOL/L (ref 98–107)
CO2 SERPL-SCNC: 21 MMOL/L (ref 22–29)
CREAT SERPL-MCNC: 1.07 MG/DL (ref 0.76–1.27)
DEPRECATED RDW RBC AUTO: 47.7 FL (ref 37–54)
EGFRCR SERPLBLD CKD-EPI 2021: 87.2 ML/MIN/1.73
EOSINOPHIL # BLD AUTO: 0.1 10*3/MM3 (ref 0–0.4)
EOSINOPHIL NFR BLD AUTO: 0.9 % (ref 0.3–6.2)
ERYTHROCYTE [DISTWIDTH] IN BLOOD BY AUTOMATED COUNT: 14.3 % (ref 12.3–15.4)
ERYTHROCYTE [SEDIMENTATION RATE] IN BLOOD: 35 MM/HR (ref 0–15)
GLUCOSE SERPL-MCNC: 155 MG/DL (ref 65–99)
HCT VFR BLD AUTO: 46.2 % (ref 37.5–51)
HGB BLD-MCNC: 16 G/DL (ref 13–17.7)
HOLD SPECIMEN: NORMAL
HOLD SPECIMEN: NORMAL
IMM GRANULOCYTES # BLD AUTO: 0.06 10*3/MM3 (ref 0–0.05)
IMM GRANULOCYTES NFR BLD AUTO: 0.6 % (ref 0–0.5)
LYMPHOCYTES # BLD AUTO: 2.67 10*3/MM3 (ref 0.7–3.1)
LYMPHOCYTES NFR BLD AUTO: 25 % (ref 19.6–45.3)
MCH RBC QN AUTO: 31.4 PG (ref 26.6–33)
MCHC RBC AUTO-ENTMCNC: 34.6 G/DL (ref 31.5–35.7)
MCV RBC AUTO: 90.8 FL (ref 79–97)
MONOCYTES # BLD AUTO: 0.62 10*3/MM3 (ref 0.1–0.9)
MONOCYTES NFR BLD AUTO: 5.8 % (ref 5–12)
NEUTROPHILS NFR BLD AUTO: 67.5 % (ref 42.7–76)
NEUTROPHILS NFR BLD AUTO: 7.22 10*3/MM3 (ref 1.7–7)
NRBC BLD AUTO-RTO: 0 /100 WBC (ref 0–0.2)
PLATELET # BLD AUTO: 268 10*3/MM3 (ref 140–450)
PMV BLD AUTO: 11.8 FL (ref 6–12)
POTASSIUM SERPL-SCNC: 3.4 MMOL/L (ref 3.5–5.2)
RBC # BLD AUTO: 5.09 10*6/MM3 (ref 4.14–5.8)
SODIUM SERPL-SCNC: 139 MMOL/L (ref 136–145)
WBC NRBC COR # BLD AUTO: 10.69 10*3/MM3 (ref 3.4–10.8)
WHOLE BLOOD HOLD COAG: NORMAL
WHOLE BLOOD HOLD SPECIMEN: NORMAL

## 2024-04-23 PROCEDURE — 80048 BASIC METABOLIC PNL TOTAL CA: CPT | Performed by: EMERGENCY MEDICINE

## 2024-04-23 PROCEDURE — 96375 TX/PRO/DX INJ NEW DRUG ADDON: CPT

## 2024-04-23 PROCEDURE — 0 GADOBENATE DIMEGLUMINE 529 MG/ML SOLUTION: Performed by: EMERGENCY MEDICINE

## 2024-04-23 PROCEDURE — 72158 MRI LUMBAR SPINE W/O & W/DYE: CPT

## 2024-04-23 PROCEDURE — 85025 COMPLETE CBC W/AUTO DIFF WBC: CPT | Performed by: EMERGENCY MEDICINE

## 2024-04-23 PROCEDURE — 72157 MRI CHEST SPINE W/O & W/DYE: CPT

## 2024-04-23 PROCEDURE — 25010000002 HYDROMORPHONE 1 MG/ML SOLUTION: Performed by: EMERGENCY MEDICINE

## 2024-04-23 PROCEDURE — G0378 HOSPITAL OBSERVATION PER HR: HCPCS

## 2024-04-23 PROCEDURE — 96376 TX/PRO/DX INJ SAME DRUG ADON: CPT

## 2024-04-23 PROCEDURE — 99285 EMERGENCY DEPT VISIT HI MDM: CPT

## 2024-04-23 PROCEDURE — 25010000002 DEXAMETHASONE PER 1 MG: Performed by: PHYSICIAN ASSISTANT

## 2024-04-23 PROCEDURE — 85652 RBC SED RATE AUTOMATED: CPT | Performed by: EMERGENCY MEDICINE

## 2024-04-23 PROCEDURE — 25010000002 ONDANSETRON PER 1 MG: Performed by: EMERGENCY MEDICINE

## 2024-04-23 PROCEDURE — 25010000002 KETOROLAC TROMETHAMINE PER 15 MG: Performed by: PHYSICIAN ASSISTANT

## 2024-04-23 PROCEDURE — A9577 INJ MULTIHANCE: HCPCS | Performed by: EMERGENCY MEDICINE

## 2024-04-23 PROCEDURE — 25010000002 KETOROLAC TROMETHAMINE PER 15 MG: Performed by: EMERGENCY MEDICINE

## 2024-04-23 PROCEDURE — 25010000002 LORAZEPAM PER 2 MG: Performed by: EMERGENCY MEDICINE

## 2024-04-23 PROCEDURE — 96374 THER/PROPH/DIAG INJ IV PUSH: CPT

## 2024-04-23 RX ORDER — KETOROLAC TROMETHAMINE 30 MG/ML
30 INJECTION, SOLUTION INTRAMUSCULAR; INTRAVENOUS EVERY 6 HOURS PRN
Status: COMPLETED | OUTPATIENT
Start: 2024-04-23 | End: 2024-04-24

## 2024-04-23 RX ORDER — SODIUM CHLORIDE 0.9 % (FLUSH) 0.9 %
10 SYRINGE (ML) INJECTION AS NEEDED
Status: DISCONTINUED | OUTPATIENT
Start: 2024-04-23 | End: 2024-04-24 | Stop reason: HOSPADM

## 2024-04-23 RX ORDER — LORAZEPAM 2 MG/ML
1 INJECTION INTRAMUSCULAR ONCE
Status: COMPLETED | OUTPATIENT
Start: 2024-04-23 | End: 2024-04-23

## 2024-04-23 RX ORDER — MELOXICAM 15 MG/1
15 TABLET ORAL DAILY
Status: DISCONTINUED | OUTPATIENT
Start: 2024-04-24 | End: 2024-04-24 | Stop reason: HOSPADM

## 2024-04-23 RX ORDER — ONDANSETRON 2 MG/ML
4 INJECTION INTRAMUSCULAR; INTRAVENOUS ONCE
Status: COMPLETED | OUTPATIENT
Start: 2024-04-23 | End: 2024-04-23

## 2024-04-23 RX ORDER — SODIUM CHLORIDE 0.9 % (FLUSH) 0.9 %
10 SYRINGE (ML) INJECTION EVERY 12 HOURS SCHEDULED
Status: DISCONTINUED | OUTPATIENT
Start: 2024-04-23 | End: 2024-04-24 | Stop reason: HOSPADM

## 2024-04-23 RX ORDER — POTASSIUM CHLORIDE 750 MG/1
40 TABLET, FILM COATED, EXTENDED RELEASE ORAL EVERY 4 HOURS
Status: COMPLETED | OUTPATIENT
Start: 2024-04-23 | End: 2024-04-24

## 2024-04-23 RX ORDER — HYDROCODONE BITARTRATE AND ACETAMINOPHEN 5; 325 MG/1; MG/1
2 TABLET ORAL EVERY 6 HOURS PRN
Status: DISCONTINUED | OUTPATIENT
Start: 2024-04-23 | End: 2024-04-24 | Stop reason: HOSPADM

## 2024-04-23 RX ORDER — SODIUM CHLORIDE 9 MG/ML
40 INJECTION, SOLUTION INTRAVENOUS AS NEEDED
Status: DISCONTINUED | OUTPATIENT
Start: 2024-04-23 | End: 2024-04-24 | Stop reason: HOSPADM

## 2024-04-23 RX ORDER — HYDROCODONE BITARTRATE AND ACETAMINOPHEN 5; 325 MG/1; MG/1
1 TABLET ORAL EVERY 6 HOURS PRN
Status: DISCONTINUED | OUTPATIENT
Start: 2024-04-23 | End: 2024-04-23

## 2024-04-23 RX ORDER — METHOCARBAMOL 750 MG/1
750 TABLET, FILM COATED ORAL 4 TIMES DAILY PRN
Status: DISCONTINUED | OUTPATIENT
Start: 2024-04-23 | End: 2024-04-24 | Stop reason: HOSPADM

## 2024-04-23 RX ORDER — DEXAMETHASONE SODIUM PHOSPHATE 10 MG/ML
10 INJECTION INTRAMUSCULAR; INTRAVENOUS ONCE
Status: COMPLETED | OUTPATIENT
Start: 2024-04-23 | End: 2024-04-23

## 2024-04-23 RX ORDER — KETOROLAC TROMETHAMINE 15 MG/ML
15 INJECTION, SOLUTION INTRAMUSCULAR; INTRAVENOUS ONCE
Status: COMPLETED | OUTPATIENT
Start: 2024-04-23 | End: 2024-04-23

## 2024-04-23 RX ORDER — LIDOCAINE 4 G/G
1 PATCH TOPICAL EVERY 24 HOURS
Status: DISCONTINUED | OUTPATIENT
Start: 2024-04-23 | End: 2024-04-24 | Stop reason: HOSPADM

## 2024-04-23 RX ADMIN — HYDROMORPHONE HYDROCHLORIDE 1 MG: 1 INJECTION, SOLUTION INTRAMUSCULAR; INTRAVENOUS; SUBCUTANEOUS at 16:59

## 2024-04-23 RX ADMIN — LORAZEPAM 1 MG: 2 INJECTION INTRAMUSCULAR; INTRAVENOUS at 18:24

## 2024-04-23 RX ADMIN — HYDROCODONE BITARTRATE AND ACETAMINOPHEN 1 TABLET: 5; 325 TABLET ORAL at 20:24

## 2024-04-23 RX ADMIN — DEXAMETHASONE SODIUM PHOSPHATE 10 MG: 10 INJECTION INTRAMUSCULAR; INTRAVENOUS at 20:24

## 2024-04-23 RX ADMIN — ONDANSETRON 4 MG: 2 INJECTION INTRAMUSCULAR; INTRAVENOUS at 16:59

## 2024-04-23 RX ADMIN — Medication 10 ML: at 20:30

## 2024-04-23 RX ADMIN — GADOBENATE DIMEGLUMINE 20 ML: 529 INJECTION, SOLUTION INTRAVENOUS at 19:11

## 2024-04-23 RX ADMIN — KETOROLAC TROMETHAMINE 15 MG: 15 INJECTION, SOLUTION INTRAMUSCULAR; INTRAVENOUS at 16:59

## 2024-04-23 RX ADMIN — METHOCARBAMOL TABLETS 750 MG: 750 TABLET, COATED ORAL at 22:37

## 2024-04-23 RX ADMIN — POTASSIUM CHLORIDE 40 MEQ: 750 TABLET, EXTENDED RELEASE ORAL at 20:24

## 2024-04-23 RX ADMIN — KETOROLAC TROMETHAMINE 30 MG: 30 INJECTION, SOLUTION INTRAMUSCULAR at 23:14

## 2024-04-23 NOTE — ED NOTES
Nursing report ED to floor  Roque Reveles  44 y.o.  male    HPI :  HPI (Adult)  Stated Reason for Visit: back pain, bowel incontinence.  History Obtained From: patient    Chief Complaint  Chief Complaint   Patient presents with    Back Pain       Admitting doctor:   Stoney Peñaloza MD    Admitting diagnosis:   The primary encounter diagnosis was Acute midline low back pain with left-sided sciatica. Diagnoses of Incontinence of feces, unspecified fecal incontinence type, Elevated sed rate, and Hyperglycemia were also pertinent to this visit.    Code status:   Current Code Status       Date Active Code Status Order ID Comments User Context       Prior            Allergies:   Patient has no known allergies.    Isolation:   No active isolations    Intake and Output  No intake or output data in the 24 hours ending 04/23/24 1808    Weight:       04/23/24  1535   Weight: 93 kg (205 lb)       Most recent vitals:   Vitals:    04/23/24 1558 04/23/24 1559 04/23/24 1600 04/23/24 1730   BP:    129/99   Patient Position:    Lying   Pulse: 115 107 114 105   Resp:       Temp:       TempSrc:       SpO2: 98% 97% 97% 97%   Weight:       Height:           Active LDAs/IV Access:   Lines, Drains & Airways       Active LDAs       Name Placement date Placement time Site Days    Peripheral IV 04/23/24 1538 Right Antecubital 04/23/24  1538  Antecubital  less than 1                    Labs (abnormal labs have a star):   Labs Reviewed   BASIC METABOLIC PANEL - Abnormal; Notable for the following components:       Result Value    Glucose 155 (*)     Potassium 3.4 (*)     CO2 21.0 (*)     All other components within normal limits    Narrative:     GFR Normal >60  Chronic Kidney Disease <60  Kidney Failure <15     SEDIMENTATION RATE - Abnormal; Notable for the following components:    Sed Rate 35 (*)     All other components within normal limits   CBC WITH AUTO DIFFERENTIAL - Abnormal; Notable for the following components:    Immature Grans %  0.6 (*)     Neutrophils, Absolute 7.22 (*)     Immature Grans, Absolute 0.06 (*)     All other components within normal limits   RAINBOW DRAW    Narrative:     The following orders were created for panel order [place] Draw.  Procedure                               Abnormality         Status                     ---------                               -----------         ------                     Green Top (Gel)[[id number]]                                  Final result               Lavender Top[[id number]]                                     Final result               Gold Top - SST[[id number]]                                   Final result               Light Blue Top[[id number]]                                   Final result                 Please view results for these tests on the individual orders.   GREEN TOP   LAVENDER TOP   GOLD TOP - SST   LIGHT BLUE TOP   CBC AND DIFFERENTIAL    Narrative:     The following orders were created for panel order CBC & Differential.  Procedure                               Abnormality         Status                     ---------                               -----------         ------                     CBC Auto Differential[[id number]]        Abnormal            Final result                 Please view results for these tests on the individual orders.       EKG:   No orders to display       Meds given in ED:   Medications   sodium chloride 0.9 % flush 10 mL (has no administration in time range)   LORazepam (ATIVAN) injection 1 mg (has no administration in time range)   ketorolac (TORADOL) injection 15 mg (15 mg Intravenous Given 4/23/24 1659)   HYDROmorphone (DILAUDID) injection 1 mg (1 mg Intravenous Given 4/23/24 1659)   ondansetron (ZOFRAN) injection 4 mg (4 mg Intravenous Given 4/23/24 1659)       Imaging results:  No radiology results for the last day    Ambulatory status:       Social issues:   Social History     Socioeconomic History    Marital status:    Tobacco  Use    Smoking status: Every Day     Current packs/day: 1.00     Types: Cigarettes   Vaping Use    Vaping status: Never Used   Substance and Sexual Activity    Alcohol use: Not Currently    Drug use: Not Currently    Sexual activity: Defer       Peripheral Neurovascular  Peripheral Neurovascular (Adult)  Peripheral Neurovascular WDL: WDL, pulse assessment  Pulse Assessment: dorsalis pedis  LLE Neurovascular Assessment  Temperature LLE: warm  Color LLE: no discoloration  Sensation LLE: numbness present, tingling present    Neuro Cognitive  Neuro Cognitive (Adult)  Cognitive/Neuro/Behavioral WDL: WDL    Learning       Respiratory  Respiratory WDL  Respiratory WDL: WDL    Abdominal Pain       Pain Assessments  Pain (Adult)  (0-10) Pain Rating: Rest: 10  (0-10) Pain Rating: Activity: 10  Pain Location: back    NIH Stroke Scale       Yecenia Beltrán RN  04/23/24 18:08 EDT

## 2024-04-23 NOTE — PROGRESS NOTES
Clinical Pharmacy Services: Medication History    Roque Reveles is a 44 y.o. male presenting to [facility] for   Chief Complaint   Patient presents with    Back Pain       He  has a past medical history of Injury of back.    Allergies as of 04/23/2024    (No Known Allergies)       Medication information was obtained from: Patient   Pharmacy and Phone Number:     Prior to Admission Medications       Prescriptions Last Dose Informant Patient Reported? Taking?    meloxicam (MOBIC) 15 MG tablet  Self No Yes    Take 1 tablet by mouth Daily.    methocarbamol (ROBAXIN) 750 MG tablet  Self No Yes    Take 1 tablet by mouth 4 (Four) Times a Day As Needed for Muscle Spasms.    albuterol sulfate  (90 Base) MCG/ACT inhaler   No No    Inhale 2 puffs Every 4 (Four) Hours As Needed for Wheezing.    Patient not taking:  Reported on 4/15/2024    benzonatate (TESSALON) 200 MG capsule   No No    Take 1 capsule by mouth 3 (Three) Times a Day As Needed for Cough.    Patient not taking:  Reported on 4/15/2024    fluticasone (FLONASE) 50 MCG/ACT nasal spray   No No    2 sprays into the nostril(s) as directed by provider Daily.    Patient not taking:  Reported on 4/15/2024    guaiFENesin (MUCINEX) 600 MG 12 hr tablet   No No    Take 1 tablet by mouth Every 12 (Twelve) Hours.    Patient not taking:  Reported on 4/15/2024    HYDROcodone-acetaminophen (NORCO) 5-325 MG per tablet   No No    Take 1-2 tablets by mouth Every 6 (Six) Hours As Needed for Severe Pain.    Patient not taking:  Reported on 4/15/2024    lidocaine (LIDODERM) 5 %   No No    Place 1 patch on the skin as directed by provider Daily. Remove & Discard patch within 12 hours or as directed by MD    Patient not taking:  Reported on 4/15/2024    predniSONE (DELTASONE) 10 MG tablet   No No    Take 6 tabs by mouth once daily day 1, 5 on day 2, 4 on day 3, 3 on day 4, 2 on day 5, 1 on day 6    Patient not taking:  Reported on 4/15/2024              Medication  notes:     This medication list is complete to the best of my knowledge as of 4/23/2024    Please call if questions.    Miya Gilmore  Medication History Technician   [phone]    4/23/2024 18:22 EDT

## 2024-04-23 NOTE — LETTER
[facility]  Demetrio VIEIRA [facility] [id number]  [phone]        April 24, 2024      Patient: Roque Reveles  YOB: 1980  Date of Visit: 4/23/2024      SEE FOR OBSERVATION:  ID# [id number]    [facility] DEPT: [phone],  [phone]    [facility]: NPI [id number] [facility]# [id number]    M54.42, M54.50    Bernarda Schwartz RN

## 2024-04-23 NOTE — H&P
[facility]   HISTORY AND PHYSICAL    Patient Name: Roque Reveles  : 1980  MRN: [id number]  Primary Care Physician:  Provider, No Known  Date of admission: 2024    Subjective   Subjective     Chief Complaint:   Chief Complaint   Patient presents with    Back Pain         HPI:    Roque Reveles is a 44 y.o. male with significant past medical history of prior back surgery and chronic lower back pain who presented to the ED complaining of lower back pain that is been gradually getting worse over the course the past month.  Patient reported he occasionally gets some tingling in his legs and occasionally gets numbness in his left leg.  He reported an episode of fecal incontinence in the ED.  Patient's pain was poorly controlled in the ED and he was admitted to the observation unit for further neurosurgery evaluation, MRI, pain control.  Patient was noted to have an elevated sed rate of 35.  Patient denies fever, chills, chest pain, shortness of breath associated with the symptoms.  He is here for further pain control and management    Review in the ED workup WBC 10.69, RBC 5.09, hemoglobin 16, hematocrit 46.2, platelets 268, sodium 139, potassium 3.4, CO2 21, anion gap 15, glucose 155.  MRI of the lumbar spine with and without contrast ordered in the ED.       Review of Systems   All systems were reviewed and negative except for: That listed above    Personal History     Past Medical History:   Diagnosis Date    Injury of back        Past Surgical History:   Procedure Laterality Date    APPENDECTOMY      BACK SURGERY         Family History: family history is not on file. Otherwise pertinent FHx was reviewed and not pertinent to current issue.    Social History:  reports that he has been smoking cigarettes. He does not have any smokeless tobacco history on file. He reports that he does not currently use alcohol. He reports that he does not currently use drugs.    Home  Medications:  HYDROcodone-acetaminophen, albuterol sulfate HFA, benzonatate, fluticasone, guaiFENesin, lidocaine, meloxicam, methocarbamol, and predniSONE    Allergies:  No Known Allergies    Objective   Objective     Vitals:   Temp:  [97 °F (36.1 °C)] 97 °F (36.1 °C)  Heart Rate:  [] 86  Resp:  [16] 16  BP: (121-134)/() 126/102  Physical Exam    Constitutional: Awake, alert   Eyes: PERRLA, sclerae anicteric, no conjunctival injection   HENT: NCAT, mucous membranes moist   Neck: Supple, no thyromegaly, no lymphadenopathy, trachea midline   Respiratory: Clear to auscultation bilaterally, nonlabored respirations    Cardiovascular: RRR, no murmurs, rubs, or gallops, palpable pedal pulses bilaterally   Gastrointestinal: Positive bowel sounds, soft, nontender, nondistended   Musculoskeletal: No bilateral ankle edema, no clubbing or cyanosis to extremities   Psychiatric: Appropriate affect, cooperative   Neurologic: Oriented x 3, strength symmetric in all extremities, Cranial Nerves grossly intact to confrontation, speech clear   Skin: No rashes     Result Review    Result Review:  I have personally reviewed the results from the time of this admission to 4/23/2024 18:28 EDT and agree with these findings:  [x]  Laboratory list / accordion  []  Microbiology  []  Radiology  []  EKG/Telemetry   []  Cardiology/Vascular   []  Pathology  []  Old records  []  Other:        Assessment & Plan   Assessment / Plan     Brief Patient Summary:  Roque Reveles is a 44 y.o. male who was admitted to the observation unit for intractable back pain.  Patient has past medical history of lumbar surgery and chronic back pain.  ED was unable to get the patient comfortable.  He reported a bout of fecal incontinence.  Patient's case was briefly discussed with neurosurgery in the ED.  Sed rate was noted to be 35.  MRI was ordered and the patient was admitted to the observation unit for further pain management and evaluation.    Active  Hospital Problems:  Active Hospital Problems    Diagnosis     **Lower back pain      Plan:     Lower back pain/fecal incontinence    -Obtain MRI further evaluate  -Continue with pain control  -Neurosurgery consultation for further management  -Hydrocodone 5/325 mg 1 p.o. every 6 hours as needed  -IV Decadron    Chronic lung disease  -Albuterol nebs as needed    DVT prophylaxis:  Mechanical DVT prophylaxis orders are present.        CODE STATUS:    Level Of Support Discussed With: Patient  Code Status (Patient has no pulse and is not breathing): CPR (Attempt to Resuscitate)  Medical Interventions (Patient has pulse or is breathing): Full Support    Admission Status:  I believe this patient meets observation status.    Electronically signed by Ish Spencer III, PA, 04/23/24, 6:10 PM EDT.        75 minutes has been spent by [facility] providers in the care of this patient while under observation status      I have worn appropriate PPE during this patient encounter, sanitized my hands both with entering and exiting patient's room.    I have discussed plan of care with patient including advance care plan and/or surrogate decision maker.  Patient advises that their mother/Liz Lieberman will be their primary surrogate decision maker

## 2024-04-23 NOTE — ED PROVIDER NOTES
EMERGENCY DEPARTMENT ENCOUNTER  Room Number:  35/35  PCP: Provider, No Known  Independent Historians: Patient      HPI:  Chief Complaint: had concerns including Back Pain.      A complete HPI/ROS/PMH/PSH/SH/FH are unobtainable due to:   Chronic or social conditions impacting patient care (Social Determinants of Health):       Context: Roque Reveles is a 44 y.o. male with a medical history of Prior back surgery, chronic low back pain who presents to the ED c/o acute worsening of lower back pain.  Patient has had increasing lower back pain over the last month or so.  Over the last several days pain has increased even more.  Pain is in the lower back, left greater than right.  It generally does not go to the legs that often.  He does occasionally get some tingling in his legs and occasionally gets some numbness in his left leg.  This is not constant and comes and goes.  He did have an episode of fairly severe pain earlier today while sitting and did have episode of fecal incontinence.  Denies urinary incontinence.  Patient is able to walk on his own power without difficulty and strength is at baseline today.  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  Denies use of illegal drugs.  Denies chronic medical conditions.    Patient was prescribed Mobic which she had to stop due to diarrhea and not currently taking NSAIDs.  He has taken muscle relaxant and followed by physical therapy.  He was recently started on a Medrol Dosepak by neurosurgery clinic.      Review of prior external notes (non-ED) -and- Review of prior external test results outside of this encounter:   I reviewed prior medical records including most from neurosurgery office note.  Patient was seen earlier this month.  He does have a history of hemilaminectomy.  Recent CT scan did show significant stenosis at L5-S1.  Patient was recently started on steroids for increasing left radicular pain.      Prescription drug monitoring program  review:         PAST MEDICAL HISTORY  Active Ambulatory Problems     Diagnosis Date Noted    Shortness of breath 10/18/2022     Resolved Ambulatory Problems     Diagnosis Date Noted    No Resolved Ambulatory Problems     Past Medical History:   Diagnosis Date    Injury of back          PAST SURGICAL HISTORY  Past Surgical History:   Procedure Laterality Date    APPENDECTOMY      BACK SURGERY           FAMILY HISTORY  No family history on file.      SOCIAL HISTORY  Social History     Socioeconomic History    Marital status:    Tobacco Use    Smoking status: Every Day     Current packs/day: 1.00     Types: Cigarettes   Vaping Use    Vaping status: Never Used   Substance and Sexual Activity    Alcohol use: Not Currently    Drug use: Not Currently    Sexual activity: Defer         ALLERGIES  Patient has no known allergies.      REVIEW OF SYSTEMS  Review of Systems   Genitourinary:         Patient reports episode of fecal incontinence earlier today associated with severe back pain.   Musculoskeletal:  Positive for back pain.     Included in HPI  All systems reviewed and negative except for those discussed in HPI.      PHYSICAL EXAM    I have reviewed the triage vital signs and nursing notes.    ED Triage Vitals   Temp Heart Rate Resp BP SpO2   04/23/24 1515 04/23/24 1515 04/23/24 1515 04/23/24 1520 04/23/24 1515   97 °F (36.1 °C) (!) 153 16 134/85 98 %      Temp src Heart Rate Source Patient Position BP Location FiO2 (%)   04/23/24 1515 -- -- -- --   Tympanic           Physical Exam  GENERAL: Alert pleasant male no obvious distress.  Triage vitals noted a heart rate of 153 at 1 point but his heart rate has been running around 100 through most of our exam.  He is afebrile and well-appearing.  Blood pressure and O2 sats are benign  SKIN: Warm, dry  HENT: Normocephalic, atraumatic  EYES: no scleral icterus  CV: regular rhythm, regular rate-slightly tachycardic with pulse just over 100  RESPIRATORY: normal effort,  lungs clear  ABDOMEN: soft, nontender, nondistended  MUSCULOSKELETAL: Diffuse lumbar tenderness, right greater than left  Upper extremities-unremarkable  Lower extremities-unremarkable  NEURO: alert, moves all extremities, follows commands  Strength is intact in bilateral upper and lower extremities.  No noted motor weakness  Sensation intact bilateral upper and lower extremities, no saddle paresthesia      LAB RESULTS  Recent Results (from the past 24 hour(s))   Green Top (Gel)    Collection Time: 04/23/24  3:39 PM   Result Value Ref Range    Extra Tube Hold for add-ons.    Lavender Top    Collection Time: 04/23/24  3:39 PM   Result Value Ref Range    Extra Tube hold for add-on    Gold Top - SST    Collection Time: 04/23/24  3:39 PM   Result Value Ref Range    Extra Tube Hold for add-ons.    Light Blue Top    Collection Time: 04/23/24  3:39 PM   Result Value Ref Range    Extra Tube Hold for add-ons.    Basic Metabolic Panel    Collection Time: 04/23/24  3:39 PM    Specimen: Blood   Result Value Ref Range    Glucose 155 (H) 65 - 99 mg/dL    BUN 12 6 - 20 mg/dL    Creatinine 1.07 0.76 - 1.27 mg/dL    Sodium 139 136 - 145 mmol/L    Potassium 3.4 (L) 3.5 - 5.2 mmol/L    Chloride 103 98 - 107 mmol/L    CO2 21.0 (L) 22.0 - 29.0 mmol/L    Calcium 9.5 8.6 - 10.5 mg/dL    BUN/Creatinine Ratio 11.2 7.0 - 25.0    Anion Gap 15.0 5.0 - 15.0 mmol/L    eGFR 87.2 >60.0 mL/min/1.73   Sedimentation Rate    Collection Time: 04/23/24  3:39 PM    Specimen: Blood   Result Value Ref Range    Sed Rate 35 (H) 0 - 15 mm/hr   CBC Auto Differential    Collection Time: 04/23/24  3:39 PM    Specimen: Blood   Result Value Ref Range    WBC 10.69 3.40 - 10.80 10*3/mm3    RBC 5.09 4.14 - 5.80 10*6/mm3    Hemoglobin 16.0 13.0 - 17.7 g/dL    Hematocrit 46.2 37.5 - 51.0 %    MCV 90.8 79.0 - 97.0 fL    MCH 31.4 26.6 - 33.0 pg    MCHC 34.6 31.5 - 35.7 g/dL    RDW 14.3 12.3 - 15.4 %    RDW-SD 47.7 37.0 - 54.0 fl    MPV 11.8 6.0 - 12.0 fL    Platelets 268  140 - 450 10*3/mm3    Neutrophil % 67.5 42.7 - 76.0 %    Lymphocyte % 25.0 19.6 - 45.3 %    Monocyte % 5.8 5.0 - 12.0 %    Eosinophil % 0.9 0.3 - 6.2 %    Basophil % 0.2 0.0 - 1.5 %    Immature Grans % 0.6 (H) 0.0 - 0.5 %    Neutrophils, Absolute 7.22 (H) 1.70 - 7.00 10*3/mm3    Lymphocytes, Absolute 2.67 0.70 - 3.10 10*3/mm3    Monocytes, Absolute 0.62 0.10 - 0.90 10*3/mm3    Eosinophils, Absolute 0.10 0.00 - 0.40 10*3/mm3    Basophils, Absolute 0.02 0.00 - 0.20 10*3/mm3    Immature Grans, Absolute 0.06 (H) 0.00 - 0.05 10*3/mm3    nRBC 0.0 0.0 - 0.2 /100 WBC         RADIOLOGY  No Radiology Exams Resulted Within Past 24 Hours      MEDICATIONS GIVEN IN ER  Medications   sodium chloride 0.9 % flush 10 mL (has no administration in time range)   ketorolac (TORADOL) injection 15 mg (15 mg Intravenous Given 4/23/24 1659)   HYDROmorphone (DILAUDID) injection 1 mg (1 mg Intravenous Given 4/23/24 1659)   ondansetron (ZOFRAN) injection 4 mg (4 mg Intravenous Given 4/23/24 1659)         ORDERS PLACED DURING THIS VISIT:  Orders Placed This Encounter   Procedures    MRI Lumbar Spine With & Without Contrast    MRI Thoracic Spine With & Without Contrast    [facility] Draw    Basic Metabolic Panel    Sedimentation Rate    CBC Auto Differential    Neurosurgery (on-call MD unless specified)    Insert Peripheral IV    Initiate ED Observation Status    Green Top (Gel)    Lavender Top    Gold Top - SST    Light Blue Top    CBC & Differential         OUTPATIENT MEDICATION MANAGEMENT:  Current Facility-Administered Medications Ordered in Epic   Medication Dose Route Frequency Provider Last Rate Last Admin    sodium chloride 0.9 % flush 10 mL  10 mL Intravenous PRN Manjinder Bae MD         Current Outpatient Medications Ordered in Epic   Medication Sig Dispense Refill    albuterol sulfate  (90 Base) MCG/ACT inhaler Inhale 2 puffs Every 4 (Four) Hours As Needed for Wheezing. (Patient not taking: Reported on 4/15/2024) 18 g 0     benzonatate (TESSALON) 200 MG capsule Take 1 capsule by mouth 3 (Three) Times a Day As Needed for Cough. (Patient not taking: Reported on 4/15/2024) 20 capsule 0    fluticasone (FLONASE) 50 MCG/ACT nasal spray 2 sprays into the nostril(s) as directed by provider Daily. (Patient not taking: Reported on 4/15/2024) 9.9 mL 0    guaiFENesin (MUCINEX) 600 MG 12 hr tablet Take 1 tablet by mouth Every 12 (Twelve) Hours. (Patient not taking: Reported on 4/15/2024) 14 tablet 0    HYDROcodone-acetaminophen (NORCO) 5-325 MG per tablet Take 1-2 tablets by mouth Every 6 (Six) Hours As Needed for Severe Pain. (Patient not taking: Reported on 4/15/2024) 15 tablet 0    lidocaine (LIDODERM) 5 % Place 1 patch on the skin as directed by provider Daily. Remove & Discard patch within 12 hours or as directed by MD (Patient not taking: Reported on 4/15/2024) 30 each 0    meloxicam (MOBIC) 15 MG tablet Take 1 tablet by mouth Daily. 30 tablet 1    methocarbamol (ROBAXIN) 750 MG tablet Take 1 tablet by mouth 4 (Four) Times a Day As Needed for Muscle Spasms. 60 tablet 0    predniSONE (DELTASONE) 10 MG tablet Take 6 tabs by mouth once daily day 1, 5 on day 2, 4 on day 3, 3 on day 4, 2 on day 5, 1 on day 6 (Patient not taking: Reported on 4/15/2024) 21 tablet 0         PROCEDURES  Procedures            PROGRESS, DATA ANALYSIS, CONSULTS, AND MEDICAL DECISION MAKING  All labs have been independently interpreted by me.  All radiology studies have been reviewed by me. All EKG's have been independently viewed and interpreted by me.  Discussion below represents my analysis of pertinent findings related to patient's condition, differential diagnosis, treatment plan and final disposition.    Differential diagnosis includes but is not limited to exacerbation of chronic back pain, lumbar radiculopathy, disc herniation, cauda equina syndrome.      ED Course as of 04/23/24 1803   Tue Apr 23, 2024   2445 I discussed evaluation of this patient with on-call  neurosurgeon, Dr. Mccollum.  Given patient's benign exam he does not feel that emergent MRI is indicated.    Will reassess after labs and pain medication.  Consider outpatient discharge with follow-up in neurosurgery clinic. [DB]   1724 Labs reviewed notable for normal white blood count and hemoglobin.    Chemistries notable for elevated glucose of 155 and a nondiabetic.  Would consider possible mild underlying diabetes.  Potassium is mildly low at 3.4 and there is mild acidosis with bicarb of 21.    Sed rate is somewhat elevated at 35.  This could be up from chronic joint disorder but certainly would have concern for discitis or epidural abscess.  Given patient's elevated white blood cell count I believe we should probably go ahead and get an MRI of the thoracic and lumbar spine with and without contrast to exclude infection in the lower spine as well as disc disease.  I suspect the likelihood of cauda equina syndrome is quite low but he may be having exacerbation of lumbar radiculopathy with possible disc herniation. [DB]   1731 Discussed results of testing with patient at bedside.  He reports pain is markedly improved after IV medications.    I am a bit concerned about his sed rate and do not want to miss a discitis or epidural abscess although I think the pretest probability is low.  I think we probably need to do an MRI with and without contrast of the thoracic and lumbar spine.     [DB]   1731 I discussed possible admission to the observation unit where we can get this MRI done and have him seen by neurosurgery tomorrow.  Patient would really rather not stay in the hospital overnight if at all possible.  Although I do not usually order a lot of MRIs on people to be discharged, I will go ahead and try to get an MRI tonight and if unremarkable try to discharge home and follow-up in neurosurgery clinic. [DB]   1800 Patient did change his mind and would rather be admitted overnight for workup of back problems with  elevated sed rate.    I did speak with TERESA Spencer who will admit to the observation unit for further evaluation and testing. [DB]      ED Course User Index  [DB] Manjinder Bea MD             AS OF 18:03 EDT VITALS:    BP - 129/99  HR - 105  TEMP - 97 °F (36.1 °C) (Tympanic)  O2 SATS - 97%    COMPLEXITY OF CARE  Complicated patient with history of low back pain presents with increasing lower back pain and an episode of fecal incontinence earlier today.    Lab work ordered and interpreted by me as above notable for elevated sed rate of 35 of unclear clinical significance.  Certainly we will have to consider discitis or epidural abscess although he does not have significant risk factors such as IV drug abuse.    Patient did have fecal incontinence which raises concern for cauda equina but he does not have other findings that would increase concern for this.    I have ordered MRI of the thoracic and lumbar spine for further evaluation and will admit to the observation unit for neurosurgery evaluation and consultation after MRI.      DIAGNOSIS  Final diagnoses:   Acute midline low back pain with left-sided sciatica   Incontinence of feces, unspecified fecal incontinence type   Elevated sed rate   Hyperglycemia         DISPOSITION  ED Disposition       ED Disposition   Decision to Admit    Condition   --    Comment   --                Please note that portions of this document were completed with a voice recognition program.    Note Disclaimer: At [facility], we believe that sharing information builds trust and better relationships. You are receiving this note because you recently visited [facility]. It is possible you will see health information before a provider has talked with you about it. This kind of information can be easy to misunderstand. To help you fully understand what it means for your health, we urge you to discuss this note with your provider.         Manjinder Bae MD  04/23/24 8216

## 2024-04-23 NOTE — ED TRIAGE NOTES
Patient to ED via PV from home. Patient c/o lower back pain that is chronic. Patient reports he came to ED today d/t bowel incontinence that began today.

## 2024-04-24 ENCOUNTER — TELEPHONE (OUTPATIENT)
Dept: NEUROSURGERY | Facility: CLINIC | Age: 44
End: 2024-04-24

## 2024-04-24 ENCOUNTER — APPOINTMENT (OUTPATIENT)
Dept: GENERAL RADIOLOGY | Facility: HOSPITAL | Age: 44
End: 2024-04-24
Payer: MEDICAID

## 2024-04-24 ENCOUNTER — PREP FOR SURGERY (OUTPATIENT)
Dept: OTHER | Facility: HOSPITAL | Age: 44
End: 2024-04-24
Payer: MEDICAID

## 2024-04-24 ENCOUNTER — APPOINTMENT (OUTPATIENT)
Dept: PAIN MEDICINE | Facility: HOSPITAL | Age: 44
End: 2024-04-24
Payer: MEDICAID

## 2024-04-24 VITALS
DIASTOLIC BLOOD PRESSURE: 76 MMHG | HEIGHT: 67 IN | BODY MASS INDEX: 31.6 KG/M2 | HEART RATE: 89 BPM | OXYGEN SATURATION: 95 % | SYSTOLIC BLOOD PRESSURE: 121 MMHG | TEMPERATURE: 97.6 F | WEIGHT: 201.3 LBS | RESPIRATION RATE: 18 BRPM

## 2024-04-24 DIAGNOSIS — M54.16 LUMBAR RADICULOPATHY: Primary | ICD-10-CM

## 2024-04-24 PROBLEM — M54.42 ACUTE MIDLINE LOW BACK PAIN WITH LEFT-SIDED SCIATICA: Status: ACTIVE | Noted: 2024-04-24

## 2024-04-24 PROBLEM — Z98.890 HISTORY OF BACK SURGERY: Status: ACTIVE | Noted: 2024-04-24

## 2024-04-24 LAB
ANION GAP SERPL CALCULATED.3IONS-SCNC: 11.5 MMOL/L (ref 5–15)
BUN SERPL-MCNC: 18 MG/DL (ref 6–20)
BUN/CREAT SERPL: 15.3 (ref 7–25)
CALCIUM SPEC-SCNC: 9.1 MG/DL (ref 8.6–10.5)
CHLORIDE SERPL-SCNC: 107 MMOL/L (ref 98–107)
CO2 SERPL-SCNC: 18.5 MMOL/L (ref 22–29)
CREAT SERPL-MCNC: 1.18 MG/DL (ref 0.76–1.27)
DEPRECATED RDW RBC AUTO: 50.3 FL (ref 37–54)
EGFRCR SERPLBLD CKD-EPI 2021: 77.5 ML/MIN/1.73
ERYTHROCYTE [DISTWIDTH] IN BLOOD BY AUTOMATED COUNT: 14.5 % (ref 12.3–15.4)
GLUCOSE SERPL-MCNC: 189 MG/DL (ref 65–99)
HCT VFR BLD AUTO: 45.8 % (ref 37.5–51)
HGB BLD-MCNC: 14.9 G/DL (ref 13–17.7)
MCH RBC QN AUTO: 30.4 PG (ref 26.6–33)
MCHC RBC AUTO-ENTMCNC: 32.5 G/DL (ref 31.5–35.7)
MCV RBC AUTO: 93.5 FL (ref 79–97)
PLATELET # BLD AUTO: 231 10*3/MM3 (ref 140–450)
PMV BLD AUTO: 11.1 FL (ref 6–12)
POTASSIUM SERPL-SCNC: 5.3 MMOL/L (ref 3.5–5.2)
RBC # BLD AUTO: 4.9 10*6/MM3 (ref 4.14–5.8)
SODIUM SERPL-SCNC: 137 MMOL/L (ref 136–145)
WBC NRBC COR # BLD AUTO: 8.98 10*3/MM3 (ref 3.4–10.8)

## 2024-04-24 PROCEDURE — G0378 HOSPITAL OBSERVATION PER HR: HCPCS

## 2024-04-24 PROCEDURE — 25010000002 KETOROLAC TROMETHAMINE PER 15 MG: Performed by: PHYSICIAN ASSISTANT

## 2024-04-24 PROCEDURE — 99222 1ST HOSP IP/OBS MODERATE 55: CPT | Performed by: NEUROLOGICAL SURGERY

## 2024-04-24 PROCEDURE — 25010000002 ONDANSETRON PER 1 MG: Performed by: PHYSICIAN ASSISTANT

## 2024-04-24 PROCEDURE — 64484 NJX AA&/STRD TFRM EPI L/S EA: CPT | Performed by: ANESTHESIOLOGY

## 2024-04-24 PROCEDURE — 25010000002 MIDAZOLAM PER 1 MG: Performed by: ANESTHESIOLOGY

## 2024-04-24 PROCEDURE — 82948 REAGENT STRIP/BLOOD GLUCOSE: CPT

## 2024-04-24 PROCEDURE — 25010000002 HYDROMORPHONE 1 MG/ML SOLUTION: Performed by: EMERGENCY MEDICINE

## 2024-04-24 PROCEDURE — 25010000002 METHYLPREDNISOLONE PER 80 MG: Performed by: ANESTHESIOLOGY

## 2024-04-24 PROCEDURE — 97530 THERAPEUTIC ACTIVITIES: CPT

## 2024-04-24 PROCEDURE — 77003 FLUOROGUIDE FOR SPINE INJECT: CPT

## 2024-04-24 PROCEDURE — 80048 BASIC METABOLIC PNL TOTAL CA: CPT | Performed by: PHYSICIAN ASSISTANT

## 2024-04-24 PROCEDURE — 85027 COMPLETE CBC AUTOMATED: CPT | Performed by: PHYSICIAN ASSISTANT

## 2024-04-24 PROCEDURE — 25510000001 IOPAMIDOL 41 % SOLUTION: Performed by: ANESTHESIOLOGY

## 2024-04-24 PROCEDURE — 96376 TX/PRO/DX INJ SAME DRUG ADON: CPT

## 2024-04-24 PROCEDURE — 25010000002 BUPIVACAINE (PF) 0.5 % SOLUTION: Performed by: ANESTHESIOLOGY

## 2024-04-24 PROCEDURE — 64483 NJX AA&/STRD TFRM EPI L/S 1: CPT | Performed by: ANESTHESIOLOGY

## 2024-04-24 PROCEDURE — 97161 PT EVAL LOW COMPLEX 20 MIN: CPT

## 2024-04-24 RX ORDER — METHYLPREDNISOLONE ACETATE 80 MG/ML
80 INJECTION, SUSPENSION INTRA-ARTICULAR; INTRALESIONAL; INTRAMUSCULAR; SOFT TISSUE ONCE
Status: CANCELLED | OUTPATIENT
Start: 2024-04-24 | End: 2024-04-24

## 2024-04-24 RX ORDER — IOPAMIDOL 408 MG/ML
12 INJECTION, SOLUTION INTRATHECAL
Status: CANCELLED | OUTPATIENT
Start: 2024-04-24 | End: 2024-04-24

## 2024-04-24 RX ORDER — METHYLPREDNISOLONE ACETATE 80 MG/ML
80 INJECTION, SUSPENSION INTRA-ARTICULAR; INTRALESIONAL; INTRAMUSCULAR; SOFT TISSUE ONCE
Status: COMPLETED | OUTPATIENT
Start: 2024-04-24 | End: 2024-04-24

## 2024-04-24 RX ORDER — MIDAZOLAM HYDROCHLORIDE 1 MG/ML
4 INJECTION INTRAMUSCULAR; INTRAVENOUS ONCE
Status: DISCONTINUED | OUTPATIENT
Start: 2024-04-24 | End: 2024-04-24

## 2024-04-24 RX ORDER — SODIUM CHLORIDE 0.9 % (FLUSH) 0.9 %
1-10 SYRINGE (ML) INJECTION AS NEEDED
Status: CANCELLED | OUTPATIENT
Start: 2024-04-24

## 2024-04-24 RX ORDER — PREDNISONE 20 MG/1
20 TABLET ORAL 2 TIMES DAILY
Qty: 10 TABLET | Refills: 0 | Status: SHIPPED | OUTPATIENT
Start: 2024-04-24 | End: 2024-04-29

## 2024-04-24 RX ORDER — IOPAMIDOL 408 MG/ML
12 INJECTION, SOLUTION INTRATHECAL
Status: COMPLETED | OUTPATIENT
Start: 2024-04-24 | End: 2024-04-24

## 2024-04-24 RX ORDER — LIDOCAINE HYDROCHLORIDE 10 MG/ML
5 INJECTION, SOLUTION INFILTRATION; PERINEURAL ONCE
Status: CANCELLED | OUTPATIENT
Start: 2024-04-24 | End: 2024-04-24

## 2024-04-24 RX ORDER — MIDAZOLAM HYDROCHLORIDE 1 MG/ML
6 INJECTION INTRAMUSCULAR; INTRAVENOUS ONCE
Status: COMPLETED | OUTPATIENT
Start: 2024-04-24 | End: 2024-04-24

## 2024-04-24 RX ORDER — NICOTINE 21 MG/24HR
1 PATCH, TRANSDERMAL 24 HOURS TRANSDERMAL
Status: DISCONTINUED | OUTPATIENT
Start: 2024-04-24 | End: 2024-04-24 | Stop reason: HOSPADM

## 2024-04-24 RX ORDER — METHOCARBAMOL 750 MG/1
750 TABLET, FILM COATED ORAL 3 TIMES DAILY
Qty: 21 TABLET | Refills: 0 | Status: SHIPPED | OUTPATIENT
Start: 2024-04-24

## 2024-04-24 RX ORDER — BUPIVACAINE HYDROCHLORIDE 5 MG/ML
10 INJECTION, SOLUTION EPIDURAL; INTRACAUDAL ONCE
Status: COMPLETED | OUTPATIENT
Start: 2024-04-24 | End: 2024-04-24

## 2024-04-24 RX ORDER — ONDANSETRON 2 MG/ML
4 INJECTION INTRAMUSCULAR; INTRAVENOUS EVERY 6 HOURS PRN
Status: DISCONTINUED | OUTPATIENT
Start: 2024-04-24 | End: 2024-04-24 | Stop reason: HOSPADM

## 2024-04-24 RX ORDER — MIDAZOLAM HYDROCHLORIDE 1 MG/ML
4 INJECTION INTRAMUSCULAR; INTRAVENOUS ONCE
Status: CANCELLED | OUTPATIENT
Start: 2024-04-24

## 2024-04-24 RX ORDER — HYDROCODONE BITARTRATE AND ACETAMINOPHEN 7.5; 325 MG/1; MG/1
1 TABLET ORAL ONCE
Status: COMPLETED | OUTPATIENT
Start: 2024-04-24 | End: 2024-04-24

## 2024-04-24 RX ORDER — BUPIVACAINE HYDROCHLORIDE 5 MG/ML
10 INJECTION, SOLUTION EPIDURAL; INTRACAUDAL ONCE
Status: CANCELLED | OUTPATIENT
Start: 2024-04-24 | End: 2024-04-24

## 2024-04-24 RX ORDER — SODIUM CHLORIDE 0.9 % (FLUSH) 0.9 %
1-10 SYRINGE (ML) INJECTION AS NEEDED
Status: DISCONTINUED | OUTPATIENT
Start: 2024-04-24 | End: 2024-04-24 | Stop reason: HOSPADM

## 2024-04-24 RX ORDER — LIDOCAINE 50 MG/G
1 PATCH TOPICAL EVERY 24 HOURS
Qty: 30 PATCH | Refills: 0 | Status: SHIPPED | OUTPATIENT
Start: 2024-04-24

## 2024-04-24 RX ORDER — LIDOCAINE HYDROCHLORIDE 10 MG/ML
5 INJECTION, SOLUTION INFILTRATION; PERINEURAL ONCE
Status: DISCONTINUED | OUTPATIENT
Start: 2024-04-24 | End: 2024-04-24 | Stop reason: HOSPADM

## 2024-04-24 RX ADMIN — HYDROMORPHONE HYDROCHLORIDE 1 MG: 1 INJECTION, SOLUTION INTRAMUSCULAR; INTRAVENOUS; SUBCUTANEOUS at 08:17

## 2024-04-24 RX ADMIN — METHYLPREDNISOLONE ACETATE 80 MG: 80 INJECTION, SUSPENSION INTRA-ARTICULAR; INTRALESIONAL; INTRAMUSCULAR; SOFT TISSUE at 10:51

## 2024-04-24 RX ADMIN — HYDROCODONE BITARTRATE AND ACETAMINOPHEN 1 TABLET: 7.5; 325 TABLET ORAL at 14:07

## 2024-04-24 RX ADMIN — IOPAMIDOL 10 ML: 408 INJECTION, SOLUTION INTRATHECAL at 10:51

## 2024-04-24 RX ADMIN — KETOROLAC TROMETHAMINE 30 MG: 30 INJECTION, SOLUTION INTRAMUSCULAR at 05:28

## 2024-04-24 RX ADMIN — BUPIVACAINE HYDROCHLORIDE 10 ML: 5 INJECTION, SOLUTION EPIDURAL; INTRACAUDAL; PERINEURAL at 10:50

## 2024-04-24 RX ADMIN — Medication 10 ML: at 08:17

## 2024-04-24 RX ADMIN — MIDAZOLAM 6 MG: 1 INJECTION INTRAMUSCULAR; INTRAVENOUS at 10:49

## 2024-04-24 RX ADMIN — HYDROCODONE BITARTRATE AND ACETAMINOPHEN 2 TABLET: 5; 325 TABLET ORAL at 12:11

## 2024-04-24 RX ADMIN — NICOTINE 1 PATCH: 21 PATCH, EXTENDED RELEASE TRANSDERMAL at 05:28

## 2024-04-24 RX ADMIN — HYDROCODONE BITARTRATE AND ACETAMINOPHEN 2 TABLET: 5; 325 TABLET ORAL at 02:05

## 2024-04-24 RX ADMIN — POTASSIUM CHLORIDE 40 MEQ: 750 TABLET, EXTENDED RELEASE ORAL at 02:05

## 2024-04-24 RX ADMIN — ONDANSETRON 4 MG: 2 INJECTION INTRAMUSCULAR; INTRAVENOUS at 08:17

## 2024-04-24 NOTE — PROGRESS NOTES
ED OBSERVATION PROGRESS/DISCHARGE SUMMARY    Date of Admission: 4/23/2024   LOS: 0 days   PCP: Provider, No Known    Final Diagnosis lower back pain      Subjective     Hospital Outcome: Pending    Roque Reveles is a 44 y.o. male with significant past medical history of prior back surgery and chronic lower back pain who presented to the ED complaining of lower back pain that is been gradually getting worse over the course the past month.  Patient reported he occasionally gets some tingling in his legs and occasionally gets numbness in his left leg.  He reported an episode of fecal incontinence in the ED.  Patient's pain was poorly controlled in the ED and he was admitted to the observation unit for further neurosurgery evaluation, MRI, pain control.  Patient was noted to have an elevated sed rate of 35.  Patient denies fever, chills, chest pain, shortness of breath associated with the symptoms.  He is here for further pain control and management     Review in the ED workup WBC 10.69, RBC 5.09, hemoglobin 16, hematocrit 46.2, platelets 268, sodium 139, potassium 3.4, CO2 21, anion gap 15, glucose 155.  MRI of the lumbar spine with and without contrast ordered in the ED.    8:39 AM: Patient has been evaluated by the neurosurgeon.  MRI and imaging has been reviewed.  They recommend epidural injection and outpatient follow-up.  Epidural injection ordered at this time.    1347: Patient feeling better after epidural.  A rapid was called and the patient was getting his LP.  He has been monitored for a few hours and remains asymptomatic with normal vitals..  He is stable.  He states he certainly had a panic attack.  He would like to go home.  He has follow-up with Dr. Root on an outpatient basis.  ROS:  General: no fevers, chills  Respiratory: no cough, dyspnea  Cardiovascular: no chest pain, palpitations  Abdomen: No abdominal pain, nausea, vomiting, or diarrhea  Neurologic: No focal weakness    Objective   Physical  Exam:  I have reviewed the vital signs.  Temp:  [97 °F (36.1 °C)-98.6 °F (37 °C)] 97.6 °F (36.4 °C)  Heart Rate:  [] 89  Resp:  [14-30] 18  BP: (102-137)/() 121/76  General Appearance:    Alert, cooperative, no distress  Head:    Normocephalic, atraumatic  Eyes:    Sclerae anicteric  Neck:   Supple, no mass  Lungs: Clear to auscultation bilaterally, respirations unlabored  Heart: Regular rate and rhythm, S1 and S2 normal, no murmur, rub or gallop  Abdomen:  Soft, non-tender, bowel sounds active, nondistended  Extremities: No clubbing, cyanosis, or edema to lower extremities  Pulses:  2+ and symmetric in distal lower extremities  Skin: No rashes   Neurologic: Oriented x3, Normal strength to extremities    Results Review:    I have reviewed the labs, radiology results and diagnostic studies.    Results from last 7 days   Lab Units 04/24/24  0500   WBC 10*3/mm3 8.98   HEMOGLOBIN g/dL 14.9   HEMATOCRIT % 45.8   PLATELETS 10*3/mm3 231     Results from last 7 days   Lab Units 04/24/24  0500 04/23/24  1539   SODIUM mmol/L 137 139   POTASSIUM mmol/L 5.3* 3.4*   CHLORIDE mmol/L 107 103   CO2 mmol/L 18.5* 21.0*   BUN mg/dL 18 12   CREATININE mg/dL 1.18 1.07   CALCIUM mg/dL 9.1 9.5   GLUCOSE mg/dL 189* 155*     Imaging Results (Last 24 Hours)       Procedure Component Value Units Date/Time    [place] [facility]ided [facility] Management Spine [[id number]] Resulted: 04/24/24 1115     Updated: 04/24/24 1115    Narrative:      This procedure was auto-finalized with no dictation required.    MRI Lumbar Spine With & Without Contrast [[id number]] Collected: 04/23/24 2005     Updated: 04/24/24 0603    Narrative:      MRI OF THE THORACIC AND LUMBAR SPINE WITH AND WITHOUT CONTRAST     CLINICAL HISTORY: Back pain with left leg numbness and tingling. Fecal  incontinence. Elevated sed rate.     TECHNIQUE: MRI of the thoracic spine was obtained with sagittal pre and  postgadolinium T1, proton density, and T2 weighted images. Additionally,  there  are axial pre and postgadolinium T1 and T2-weighted images through  the thoracic spine.     FINDINGS:     There is mild dextroconvex scoliotic curvature of the thoracic spine  with its apex centered at T6-7. There is maintenance of vertebral body  height. There is no evidence for an acute or subacute compression  fracture. There are areas of degenerative endplate irregularity noted at  T5-6, T6-7, T7-8, T8-9, T9-10, and T10-11. Incidental Schmorl's nodes  are seen within the mid and lower thoracic spine. There is a tiny  central protrusion at T5-6 minimally indenting the ventral subarachnoid  space. Otherwise, no significant canal or foraminal stenosis is  appreciated. No abnormal foci of contrast enhancement are noted. The  thoracic spinal cord has normal signal intensity.       Impression:         Mild degenerative phenomena are appreciated within the thoracic spine as  discussed above. There is essentially no significant canal or foraminal  stenosis within the thoracic spine.     Also, there is no convincing evidence to suggest an inflammatory process  within the thoracic spine.        TECHNIQUE: MRI of the lumbar spine is obtained with sagittal pre and  postgadolinium T1, proton density, and T2 weighted images. Additionally,  there are axial pre and postgadolinium T1 and axial T2 weighted images  through the lumbar spine.     FINDINGS:     The conus medullaris terminates at the level of the L1-2 disc space and  has normal signal intensity. The visualized distal thoracic spinal cord  is unremarkable.     At L1-2, L2-3, and L3-4, there is no significant canal or foraminal  narrowing.     At L4-5, there is a disc bulge which mildly narrows the neural foramina.  Additionally, the disc bulge and a small central disc protrusion mildly  indent the ventral subarachnoid space.     At L5-S1, bulging disc material mildly narrows the neural foramina.  Also, there is a small central disc protrusion which mildly indents  the  ventral subarachnoid space and abuts the descending right S1 nerve root  sleeve just as it exits the thecal sac.     There are no pathological areas of marrow replacement. No abnormal areas  of contrast enhancement are identified.     IMPRESSION:     At L4-5, there is a disc bulge which mildly narrows the neural foramina.  Additionally, bulging disc material and a small central disc protrusion  mildly indent the ventral subarachnoid space at the L4-5 level. At the  L5-S1 level, bulging disc material again mildly narrows the neural  foramina. There is a small central disc protrusion at L5-S1 which mildly  indents the ventral subarachnoid space and abuts the descending right S1  nerve root sleeve just as it exits the thecal sac.     There is no evidence to suggest an inflammatory process involving the  lumbar spine.              This report was finalized on 4/24/2024 6:00 AM by Dr. Roque Newman M.D  on Workstation: [id number]       MRI Thoracic Spine With & Without Contrast [[id number]] Collected: 04/23/24 2005     Updated: 04/24/24 0603    Narrative:      MRI OF THE THORACIC AND LUMBAR SPINE WITH AND WITHOUT CONTRAST     CLINICAL HISTORY: Back pain with left leg numbness and tingling. Fecal  incontinence. Elevated sed rate.     TECHNIQUE: MRI of the thoracic spine was obtained with sagittal pre and  postgadolinium T1, proton density, and T2 weighted images. Additionally,  there are axial pre and postgadolinium T1 and T2-weighted images through  the thoracic spine.     FINDINGS:     There is mild dextroconvex scoliotic curvature of the thoracic spine  with its apex centered at T6-7. There is maintenance of vertebral body  height. There is no evidence for an acute or subacute compression  fracture. There are areas of degenerative endplate irregularity noted at  T5-6, T6-7, T7-8, T8-9, T9-10, and T10-11. Incidental Schmorl's nodes  are seen within the mid and lower thoracic spine. There is a tiny  central  protrusion at T5-6 minimally indenting the ventral subarachnoid  space. Otherwise, no significant canal or foraminal stenosis is  appreciated. No abnormal foci of contrast enhancement are noted. The  thoracic spinal cord has normal signal intensity.       Impression:         Mild degenerative phenomena are appreciated within the thoracic spine as  discussed above. There is essentially no significant canal or foraminal  stenosis within the thoracic spine.     Also, there is no convincing evidence to suggest an inflammatory process  within the thoracic spine.        TECHNIQUE: MRI of the lumbar spine is obtained with sagittal pre and  postgadolinium T1, proton density, and T2 weighted images. Additionally,  there are axial pre and postgadolinium T1 and axial T2 weighted images  through the lumbar spine.     FINDINGS:     The conus medullaris terminates at the level of the L1-2 disc space and  has normal signal intensity. The visualized distal thoracic spinal cord  is unremarkable.     At L1-2, L2-3, and L3-4, there is no significant canal or foraminal  narrowing.     At L4-5, there is a disc bulge which mildly narrows the neural foramina.  Additionally, the disc bulge and a small central disc protrusion mildly  indent the ventral subarachnoid space.     At L5-S1, bulging disc material mildly narrows the neural foramina.  Also, there is a small central disc protrusion which mildly indents the  ventral subarachnoid space and abuts the descending right S1 nerve root  sleeve just as it exits the thecal sac.     There are no pathological areas of marrow replacement. No abnormal areas  of contrast enhancement are identified.     IMPRESSION:     At L4-5, there is a disc bulge which mildly narrows the neural foramina.  Additionally, bulging disc material and a small central disc protrusion  mildly indent the ventral subarachnoid space at the L4-5 level. At the  L5-S1 level, bulging disc material again mildly narrows the  neural  foramina. There is a small central disc protrusion at L5-S1 which mildly  indents the ventral subarachnoid space and abuts the descending right S1  nerve root sleeve just as it exits the thecal sac.     There is no evidence to suggest an inflammatory process involving the  lumbar spine.              This report was finalized on 4/24/2024 6:00 AM by Dr. Roque Newman M.D  on Workstation: [id number]               I have reviewed the medications.  ---------------------------------------------------------------------------------------------  Assessment & Plan   Assessment/Problem List    Acute midline low back pain with left-sided sciatica    Lower back pain    History of back surgery      Plan:     Lower back pain/fecal incontinence  -Neurosurgery consultation for further management  +ESR  MRI T/L w/o: no infxn, disc bulge/protrusion  -2 mg Decadron IV ordered/Norco 5/325 every 6 hours for 4 doses  -Epidural injection of the lumbar spine pleat the patient symptoms have improved.  To discharge home at this time with the patient to follow-up with Dr. Goncalves on an outpatient basis.  Will treat with prednisone, muscle relaxer, lidocaine patch.  Patient is to return with worsening symptoms or should he have any further concerns.     Chronic lung disease  -Albuterol nebs as needed     DVT prophylaxis:  Mechanical DVT prophylaxis orders are present.    Disposition: Pending    Follow-up after Discharge: With neurosurgery for further management    This note will serve as a progress note    Ish Spencer III, PA 04/24/24 13:55 EDT    I have worn appropriate PPE during this patient encounter, sanitized my hands both with entering and exiting patient's room.      56 minutes has been spent by [facility] providers in the care of this patient while under observation status

## 2024-04-24 NOTE — SIGNIFICANT NOTE
"Patient had told the nurse that he was going to go out to smoke at this time.  The nurse had told patient he cannot do that and nicotine patch was offered however patient states he does not want this.  I personally talked to the patient and offer nicotine patch at first patient did not want this and stated that \"you let other people go out and smoke\".  I reiterated that this is not the case.  If patient were to leave he would be leaving AGAINST MEDICAL ADVICE at this time.  I reiterated the plan for neurosurgery to see the patient in the morning and patient was then agreeable to nicotine patch at this time.  Patient is also upset that has been pain is not being controlled at this time even after patient was given additional Norco for his nighttime dose.  Patient appears very frustrated at this time.   "

## 2024-04-24 NOTE — NURSING NOTE
1103 Pt came out of treatment into recovery with minor hand tremors noted. RN notified Dr. Haney. Tremors worsened and patient became less responsive.  Dr. Haney evaluated patient. Emergency supplies gathered. 02 at 4L/NC 1106 Lipids started with pressure bag.  Rapid response called. Manager notified. 1108 vital signs monitored and O2 increased to 10L /per N/C.  1113 Rapid response team is at bedside and 1000cc LR infusion started after Lipids complete. 1116 Blood sugar 132 and patient seems to be improving. Resonsive to verbal stimuli.  Dr. Haney talking with pharmacy and his doctor in the observation unit. 1121  O2 down to 6L/NC pt more alert and responding to questions that were asked. 1126  O2 down to 2L/NC. Rapid Response left the unit and O2 off at 1127.  1130 See post recovery notes for condition. Vital signs stable through out recovery.

## 2024-04-24 NOTE — THERAPY DISCHARGE NOTE
Patient Name: Roque Reveles  : 1980    MRN: [id number]                              Today's Date: 2024       Admit Date: 2024    Visit Dx:     ICD-10-CM ICD-9-CM   1. Acute midline low back pain with left-sided sciatica  M54.42 724.2     724.3   2. Incontinence of feces, unspecified fecal incontinence type  R15.9 787.60   3. Elevated sed rate  R70.0 790.1   4. Hyperglycemia  R73.9 790.29   5. Lumbar radiculopathy  M54.16 724.4   6. DDD (degenerative disc disease), lumbosacral  M51.37 722.52   7. Lumbosacral radiculopathy  M54.17 724.4   8. Mechanical low back pain  M54.59 724.2     Patient Active Problem List   Diagnosis    Shortness of breath    Lower back pain    Acute midline low back pain with left-sided sciatica    History of back surgery     Past Medical History:   Diagnosis Date    Injury of back      Past Surgical History:   Procedure Laterality Date    APPENDECTOMY      BACK SURGERY        General Information       Row Name 24 1451          Physical Therapy Time and Intention    Document Type discharge evaluation/summary  -MS     Mode of Treatment physical therapy;individual therapy  -MS       Row Name 24 1451          General Information    Patient Profile Reviewed yes  -MS     Prior Level of Function independent:  -MS     Existing Precautions/Restrictions spinal  -MS     Barriers to Rehab none identified  -MS       Row Name 24 1451          Cognition    Orientation Status (Cognition) oriented x 3  -MS       Row Name 24 1451          Safety Issues, Functional Mobility    Comment, Safety Issues/Impairments (Mobility) Gait belt used for safety.  -MS               User Key  (r) = Recorded By, (t) = Taken By, (c) = Cosigned By      Initials Name Provider Type    Sancho Motley, PT Physical Therapist                   Mobility       Row Name 24 1451          Bed Mobility    Bed Mobility supine-sit;sit-supine  -MS     Supine-Sit [place] (Bed Mobility)  independent  -MS     Sit-Supine [place] (Bed Mobility) independent  -MS       Row Name 04/24/24 1451          Sit-Stand Transfer    Sit-Stand [place] (Transfers) independent  -MS       Row Name 04/24/24 1451          Gait/Stairs (Locomotion)    [place] Level (Gait) independent  -MS     Distance in Feet (Gait) 100  -MS     Comment, (Gait/Stairs) Guarded due to pain but no overt losses of balance noted.  -MS               User Key  (r) = Recorded By, (t) = Taken By, (c) = Cosigned By      Initials Name Provider Type    MS MariePeraltaSancho, PT Physical Therapist                   Obj/Interventions       Row Name 04/24/24 1452          Range of Motion Comprehensive    Comment, General Range of Motion BUE/LE (WFL's)  -MS       Row Name 04/24/24 1452          Strength Comprehensive (MMT)    Comment, General Manual Muscle Testing (MMT) Assessment BUE/RLE (4-/5);  LLE (3+/5)  -MS               User Key  (r) = Recorded By, (t) = Taken By, (c) = Cosigned By      Initials Name Provider Type    MS PeraltaSancho, PT Physical Therapist                   Goals/Plan    No documentation.                  Clinical Impression       Row Name 04/24/24 1452          Pain    Pretreatment Pain Rating 8/10  -MS     Posttreatment Pain Rating 8/10  -MS     Pain Location - back  -MS     Pre/Posttreatment Pain Comment No radiating pain into LLE this PM after epidural  -MS     Pain Intervention(s) Medication (See MAR);Nursing Notified;Repositioned  -MS       Row Name 04/24/24 1452          Plan of Care Review    Plan of Care Reviewed With patient;family  -MS       Row Name 04/24/24 1452          Therapy Assessment/Plan (PT)    Criteria for Skilled Interventions Met (PT) no problems identified which require skilled intervention  -MS       Row Name 04/24/24 1452          Positioning and Restraints    Pre-Treatment Position in bed  -MS     Post Treatment Position bed  -MS     In Bed notified nsg;supine;call light within  reach;encouraged to call for assist;exit alarm on;with family/caregiver  -MS               User Key  (r) = Recorded By, (t) = Taken By, (c) = Cosigned By      Initials Name Provider Type    MS Peralta Sancho GUY, PT Physical Therapist                   Outcome Measures       Row Name 04/24/24 1453 04/24/24 0800       How much help from another person do you currently need...    Turning from your back to your side while in flat bed without using bedrails? 4  -MS 3  -CL    Moving from lying on back to sitting on the side of a flat bed without bedrails? 4  -MS 3  -CL    Moving to and from a bed to a chair (including a wheelchair)? 4  -MS 3  -CL    Standing up from a chair using your arms (e.g., wheelchair, bedside chair)? 4  -MS 3  -CL    Climbing 3-5 steps with a railing? 4  -MS 2  -CL    To walk in hospital room? 4  -MS 2  -CL    AM-PAC 6 Clicks Score (PT) 24  -MS 16  -CL    Highest Level of Mobility Goal 8 --> Walked 250 feet or more  -MS 5 --> Static standing  -CL      Row Name 04/24/24 1453          Functional Assessment    Outcome Measure Options AM-PAC 6 Clicks Basic Mobility (PT)  -MS               User Key  (r) = Recorded By, (t) = Taken By, (c) = Cosigned By      Initials Name Provider Type    MS PeraltaSancho, PT Physical Therapist    CL Leon Reyes, Claudia, RN Registered Nurse                  Physical Therapy Education       Title: PT OT SLP Therapies (Resolved)       Topic: Physical Therapy (Resolved)       Point: Mobility training (Resolved)       Learning Progress Summary             Patient Acceptance, E,D, VU,DU by MS at 4/24/2024 1454                         Point: Home exercise program (Resolved)       Learner Progress:  Not documented in this visit.              Point: Body mechanics (Resolved)       Learning Progress Summary             Patient Acceptance, E,D, VU,DU by MS at 4/24/2024 1454                         Point: Precautions (Resolved)       Learning Progress Summary             Patient  Acceptance, E,D, VU,DU by MS at 4/24/2024 1454                                         User Key       Initials Effective Dates Name Provider Type Discipline    MS 06/16/21 -  Sancho Peralta, PT Physical Therapist PT                  PT Recommendation and Plan     Plan of Care Reviewed With: patient, family     Time Calculation:         PT Charges       Row Name 04/24/24 1454             Time Calculation    Start Time 1320  -MS      Stop Time 1335  -MS      Time Calculation (min) 15 min  -MS      PT Received On 04/24/24  -MS         Time Calculation- PT    Total Timed Code Minutes- PT 14 minute(s)  -MS                User Key  (r) = Recorded By, (t) = Taken By, (c) = Cosigned By      Initials Name Provider Type    MS Sancho Peralta, PT Physical Therapist                  Therapy Charges for Today       Code Description Service Date Service Provider Modifiers Qty    73186263802 HC PT EVAL LOW COMPLEXITY 1 4/24/2024 Sancho Peralta, PT GP 1            PT G-Codes  Outcome Measure Options: AM-PAC 6 Clicks Basic Mobility (PT)  AM-PAC 6 Clicks Score (PT): 24    PT Discharge Summary  Anticipated Discharge Disposition (PT): home, home with outpatient therapy services  Reason for Discharge: Independent  Discharge Destination: Home, Home with outpatient services    Sancho Peralta, PT  4/24/2024

## 2024-04-24 NOTE — PLAN OF CARE
Goal Outcome Evaluation:  Plan of Care Reviewed With: patient, family         Pt. is currently independent with functional mobility and has no further questions/concerns regarding functional mobility or home safety.  Encouraged pt. to continue ambulation with nursing staff while here in the hospital.  Otherwise, P.T. will sign off.          Anticipated Discharge Disposition (PT): home, home with outpatient therapy services

## 2024-04-24 NOTE — PROGRESS NOTES
Patient Care Team:  Provider, No Known as PCP - General [facility] NI H&P Attestation Note    I supervised care provided by the midlevel provider. We have discussed this patient's history, physical exam, and treatment plan. I have reviewed the midlevel provider's note and I agree with the midlevel provider's findings and plan of care.   SHARED VISIT: This visit was performed by BOTH a physician and an APC. The substantive portion of the medical decision making was performed by this attesting physician who made or approved the management plan and takes responsibility for patient management.   I have personally had a face to face encounter with the patient.   My personal findings are documented below:    History:  44-year-old male with history of prior back surgery and chronic back pain presents with progressive low back pain with left lower extremity paresthesias and episode of fecal incontinence.  ED workup unremarkable other than glucose 155, sed rate elevated, pain uncontrolled.    Physical Exam:  General: No acute distress.  HENT: NCAT, PERRL, Nares patent.  Eyes: no scleral icterus.  Neck: trachea midline, no ROM limitations.  CV: regular rhythm, regular rate.  Respiratory: normal effort, CTAB.  Abdomen: soft, nondistended, NTTP, no rebound tenderness, no guarding or rigidity.  Musculoskeletal: no deformity.  Neuro: alert, moves all extremities, follows commands.  Skin: warm, dry.    Assessment and Plan:  Patient admitted to observation unit, neurosurgery consulted, obtaining MRI imaging of thoracic and lumbar spine, treating pain, PT consulted.

## 2024-04-24 NOTE — DISCHARGE INSTRUCTIONS
[facility] - Post-procedure Instructions          --  While there are no absolute restrictions, it is recommended that you do not perform strenuous activity today. In the morning, you may resume your level of activity as before your block.    --  If you have a band-aid at your injection site, please remove it later today. Observe the area for any redness, swelling, pus-like drainage, or a temperature over 101°. If any of these symptoms occur, please call your doctor at [phone]. If after office hours, leave a message and the on-call provider will return your call.    --  Ice may be applied to your injection site. It is recommended you avoid direct heat (heating pad; hot tub) for 1-2 days.    --  Call [facility] at [phone] if you experience persistent headache, persistent bleeding from the injection site, or severe pain not relieved by heat or oral medication.    --  Do not make important decisions today.    --  Due to the effects of the block and/or the I.V. Sedation, DO NOT drive or operate hazardous machinery for 12 hours.  Local anesthetics may cause numbness after procedure and precautions must be taken with regards to operating equipment as well as with walking, even if ambulating with assistance of another person or with an assistive device.    --  Do not drink alcohol for 12 hours.    -- You may return to work tomorrow, or as directed by your referring doctor.    --  Occasionally you may notice a slight increase in your pain after the procedure. This should start to improve within the next 24-48 hours. Radiofrequency ablation procedure pain may last 3-4 weeks.    --  It may take as long as 3-4 days before you notice a gradual improvement in your pain and/or other symptoms.    -- You may continue to take your prescribed pain medication as needed.    --  Some normal possible side effects of steroid use could include fluid retention, increased blood sugar, dull headache,  increased sweating, increased appetite, mood swings and flushing.    --  Diabetics are recommended to watch their blood glucose level closely for 24-48 hours after the injection.    --  Must stay in PACU for 20 min upon arrival and prove no leg weakness before being discharged.    --  IN THE EVENT OF A LIFE THREATENING EMERGENCY, (CHEST PAIN, BREATHING DIFFICULTIES, PARALYSIS…) YOU SHOULD GO TO YOUR NEAREST EMERGENCY ROOM.    --  You should be contacted by our office within 2-3 days to schedule follow up or next appointment date.  If not contacted within 7 days, please call the office at [phone]      The 5 days of prednisone as provided, muscle relaxer as provided, and use lidocaine patches as prescribed and needed.  Follow-up with Dr. Root as discussed.  Return to the ER with worsening symptoms, new symptoms, or suggestive any further concerns.

## 2024-04-24 NOTE — PLAN OF CARE
Goal Outcome Evaluation:              Outcome Evaluation: pt was duischarged with all belongings and discharge paperwork, p to follow up with pcp and nuerosurgery as indicated, pt has no futher questions at the time of discharge, wife provided transport

## 2024-04-24 NOTE — NURSING NOTE
Pt to OBS unit per. K>corbin APRN/Nurse Manager and Smiley RN Pt in stable conditon. Siderails up x2.

## 2024-04-24 NOTE — NURSING NOTE
Phone report to Rose GARCIA of pt status and recap of pt from tx to recovery room. Pt vss and given. Pt alert and oriented. Pt on room air. LR 1000cc infusing. Transforaminal lumbar epidural done.   no

## 2024-04-24 NOTE — TELEPHONE ENCOUNTER
----- Message from Sohail Goncalves MD sent at 4/24/2024  9:00 AM EDT -----  Regarding: Follow up appt/assist scheduling of prior PT order from [facility]  Patient seen in OPS and getting TERESA today.  No role for surgery.  Needs follow up in 4 wks with me or NP in [place].  [facility] saw last week, patient does not want to follow up with [facility] and needs help setting up PT [facility] ordered.

## 2024-04-24 NOTE — PROGRESS NOTES
"Brief Pre-procedural / Pre-operative H&P        -----    Pertinent Diagnosis:   Left lumbar radiculopathy    Proposed Procedure: Lumbar transforaminal epidural steroid injection, anticipated on the left      Subjective   Roque Reveles is a 44 y.o. male  who presents for intervention.  He has a history of back pain and leg pain.      History of Present Illness     This young man presented to hospital and was admitted the emergency department had a neurosurgical consultation.  Neurosurgeon was requesting epidural steroid application because of his acute left leg sciatica symptoms, thinking that it would be necessary to get him out of the acute exacerbation so that he could discharge home and then further present to physical therapy for more care.    He has seen colleagues in our neurosurgery group recently as an outpatient.  In July 2010 he had surgery with Dr. Lackey because of left lower extremity neurologic deficit secondary to a disc herniation.    The neurosurgeon noted a lack of numbness or weakness in the leg but some subjective giving way occasionally but \"not generally weak\" and no other red flag symptoms and no bowel or bladder symptoms.    There is some disc displacement at L4-5 and L5-S1.  There is a more rightward impact of the L5-S1 disc on the right S1 nerve root.  I think with some disc bulging which could be consistent anatomically with the symptoms but also this finding does not exactly correlate with his symptoms, all this being consider the diagnostic aspect of the transforaminal injection make it more preferable.  Especially in the setting of previous back surgery done interlaminar approach is not the preferred approach especially with the unilateral symptoms, unilateral on the left.    -------    The following portions of the patient's history were reviewed and updated as appropriate: allergies, current medications, past family history, past medical history, past social history, past surgical " history and problem list.    No Known Allergies    No current facility-administered medications for this visit.  No current outpatient medications on file.    Facility-Administered Medications Ordered in Other Visits:     HYDROcodone-acetaminophen (NORCO) 5-325 MG per tablet 2 tablet, 2 tablet, Oral, Q6H PRN, Mj Galicia PA, 2 tablet at 04/24/24 0205    Lidocaine 4 % 1 patch, 1 patch, Transdermal, Q24H, Mj Galicia PA    meloxicam (MOBIC) tablet 15 mg, 15 mg, Oral, Daily, Mj Galicia PA    methocarbamol (ROBAXIN) tablet 750 mg, 750 mg, Oral, 4x Daily PRN, Mj Galicia PA, 750 mg at 04/23/24 2237    nicotine (NICODERM CQ) 21 MG/24HR patch 1 patch, 1 patch, Transdermal, Q24H, Mj Galicia PA, 1 patch at 04/24/24 0528    ondansetron (ZOFRAN) injection 4 mg, 4 mg, Intravenous, Q6H PRN, Ish Spencer III, PA, 4 mg at 04/24/24 0817    Potassium Replacement - Follow Nurse / BPA Driven Protocol, , Does not apply, PRN, Ish Spenecr III, PA    Insert Peripheral IV, , , Once **AND** sodium chloride 0.9 % flush 10 mL, 10 mL, Intravenous, PRN, Manjinder Bae MD    sodium chloride 0.9 % flush 10 mL, 10 mL, Intravenous, Q12H, Ish Spencer III, PA, 10 mL at 04/24/24 0817    sodium chloride 0.9 % flush 10 mL, 10 mL, Intravenous, PRN, Ish Spencer III, PA    sodium chloride 0.9 % infusion 40 mL, 40 mL, Intravenous, PRN, Ish Spencer III, PA    Current Facility-Administered Medications on File Prior to Visit   Medication Dose Route Frequency Provider Last Rate Last Admin    [COMPLETED] dexAMETHasone (DECADRON) injection 10 mg  10 mg Intravenous Once Ish Spencer III, PA   10 mg at 04/23/24 2024    [COMPLETED] gadobenate dimeglumine (MULTIHANCE) injection 20 mL  20 mL Intravenous Once in imaging Manjinder Bae MD   20 mL at 04/23/24 1911    HYDROcodone-acetaminophen (NORCO) 5-325 MG per tablet 2 tablet  2 tablet Oral Q6H PRN Mj Galicia PA   2 tablet at 04/24/24 0200     [COMPLETED] HYDROmorphone (DILAUDID) injection 1 mg  1 mg Intravenous Once KathiaManjinder MD   1 mg at 04/23/24 1659    [COMPLETED] HYDROmorphone (DILAUDID) injection 1 mg  1 mg Intravenous Once Stoney Peñaloza MD   1 mg at 04/24/24 0817    [COMPLETED] ketorolac (TORADOL) injection 15 mg  15 mg Intravenous Once KathiaManjinder MD   15 mg at 04/23/24 1659    [COMPLETED] ketorolac (TORADOL) injection 30 mg  30 mg Intravenous Q6H PRN Mj Galicia PA   30 mg at 04/24/24 0528    Lidocaine 4 % 1 patch  1 patch Transdermal Q24H Mj Galicia PA        [COMPLETED] LORazepam (ATIVAN) injection 1 mg  1 mg Intravenous Once Kathia, Manjinder PAREDES MD   1 mg at 04/23/24 1824    meloxicam (MOBIC) tablet 15 mg  15 mg Oral Daily Mj Galicia PA        methocarbamol (ROBAXIN) tablet 750 mg  750 mg Oral 4x Daily PRN Mj Galicia PA   750 mg at 04/23/24 2237    nicotine (NICODERM CQ) 21 MG/24HR patch 1 patch  1 patch Transdermal Q24H Mj Galicia PA   1 patch at 04/24/24 0528    [COMPLETED] ondansetron (ZOFRAN) injection 4 mg  4 mg Intravenous Once Monroe, Manjinder PAREDES MD   4 mg at 04/23/24 1659    ondansetron (ZOFRAN) injection 4 mg  4 mg Intravenous Q6H PRN Ish Spencer III, PA   4 mg at 04/24/24 0817    [COMPLETED] potassium chloride (K-DUR,KLOR-CON) ER tablet 40 mEq  40 mEq Oral Q4H Ish Spencer III, PA   40 mEq at 04/24/24 0205    Potassium Replacement - Follow Nurse / BPA Driven Protocol   Does not apply PRN Ish Spencer III, PA        sodium chloride 0.9 % flush 10 mL  10 mL Intravenous PRN Manjinder Bae MD        sodium chloride 0.9 % flush 10 mL  10 mL Intravenous Q12H Ish Spencer III, PA   10 mL at 04/24/24 0817    sodium chloride 0.9 % flush 10 mL  10 mL Intravenous PRN Ish Spencer III, PA        sodium chloride 0.9 % infusion 40 mL  40 mL Intravenous PRN Ish Spencer III, PA        [DISCONTINUED] HYDROcodone-acetaminophen (NORCO) 5-325 MG per tablet 1 tablet  1  tablet Oral Q6H PRN Ish Spencer III, PA   1 tablet at 04/23/24 2024     Current Outpatient Medications on File Prior to Visit   Medication Sig Dispense Refill    albuterol sulfate  (90 Base) MCG/ACT inhaler Inhale 2 puffs Every 4 (Four) Hours As Needed for Wheezing. (Patient not taking: Reported on 4/15/2024) 18 g 0    benzonatate (TESSALON) 200 MG capsule Take 1 capsule by mouth 3 (Three) Times a Day As Needed for Cough. (Patient not taking: Reported on 4/15/2024) 20 capsule 0    fluticasone (FLONASE) 50 MCG/ACT nasal spray 2 sprays into the nostril(s) as directed by provider Daily. (Patient not taking: Reported on 4/15/2024) 9.9 mL 0    guaiFENesin (MUCINEX) 600 MG 12 hr tablet Take 1 tablet by mouth Every 12 (Twelve) Hours. (Patient not taking: Reported on 4/15/2024) 14 tablet 0    HYDROcodone-acetaminophen (NORCO) 5-325 MG per tablet Take 1-2 tablets by mouth Every 6 (Six) Hours As Needed for Severe Pain. (Patient not taking: Reported on 4/15/2024) 15 tablet 0    lidocaine (LIDODERM) 5 % Place 1 patch on the skin as directed by provider Daily. Remove & Discard patch within 12 hours or as directed by MD (Patient not taking: Reported on 4/15/2024) 30 each 0    meloxicam (MOBIC) 15 MG tablet Take 1 tablet by mouth Daily. 30 tablet 1    methocarbamol (ROBAXIN) 750 MG tablet Take 1 tablet by mouth 4 (Four) Times a Day As Needed for Muscle Spasms. 60 tablet 0    predniSONE (DELTASONE) 10 MG tablet Take 6 tabs by mouth once daily day 1, 5 on day 2, 4 on day 3, 3 on day 4, 2 on day 5, 1 on day 6 (Patient not taking: Reported on 4/15/2024) 21 tablet 0       Patient Active Problem List   Diagnosis    Shortness of breath    Lower back pain    Acute midline low back pain with left-sided sciatica    History of back surgery       Past Medical History:   Diagnosis Date    Injury of back        Past Surgical History:   Procedure Laterality Date    APPENDECTOMY      BACK SURGERY         No family history on  file.    Social History     Socioeconomic History    Marital status:    Tobacco Use    Smoking status: Every Day     Current packs/day: 0.50     Types: Cigarettes   Vaping Use    Vaping status: Never Used   Substance and Sexual Activity    Alcohol use: Not Currently    Drug use: Not Currently    Sexual activity: Defer       -------       Review of Systems  No Fever, No Chills, No ear pain, No sinus pressure or drainage, No eye pain or drainage, No cough, No SOB, No chest tightness nor chest pain, no palpitations.          There were no vitals filed for this visit.  See RN cover sheet    Objective   Physical Exam  VSS, NNR, NCAT, NMNA, NRD, AAOx3.  Slr pos L  mp3    -------    Assessment & Plan:  - as noted above, the stated intervention is indicated  - Follow-up plan will be noted in the operative report        Asa2.   Acute pain, spasm, IV versed is reasonable for anxiolysis and muscle relaxation      EMR Dragon/Transcription disclaimer:   Typed items in this encounter note may have been created by electronic transcription/translation software which converts spoken language to printed text. The electronic translation of spoken language may permit erroneous, or at times, nonsensical words or phrases to be inadvertently transcribed; Although I have reviewed the note for such errors, some may still exist.

## 2024-04-24 NOTE — CONSULTS
NEUROSURGERY CONSULT      Roque Reveles  1980  [id number]    Referring Provider: Manjinder Bae MD  [address] [place] [address]  [place] [address]  Reason for Consultation: Acute on chronic low back pain with left-sided sciatica    Patient Care Team:  Provider, No Known as PCP - General    Chief Complaint: Low back pain with left-sided sciatica    Subjective .     History of Present Illness: Patient is a 44-year-old  male who suffered from chronic low back issues following an emergent surgery on July 4, 2010 for left sided disc herniation in the lower lumbar spine causing neurologic deficit in his lower extremity.  Stung by Dr. Paul Lackey who is no longer with [facility].  He recovered from the neurologic deficit but has had chronic back problems off and on since that time.      He has not had any other treatment medically since his back surgery but 4 weeks ago he developed an acutely worsened low back pain spontaneously with symptoms radiating into the left leg.  Given the pattern of symptoms he became concerned and sought neurosurgical consultation.  He did see [facility] last week and was recommended physical therapy based on the results of the CT scan.  This week the patient felt like things were not improving although he had not started the therapy yet and presented to the emergency room last night.  MRIs of the thoracic and lumbar spine were done last night and we have been asked to consult this morning regarding the results.  He denies any numbness or weakness in the left lower extremity at this time he does state a time or 2 he felt like his left leg gave out on him but it does not generally weak.  He denies any bowel or bladder symptoms.    Review of Systems  Review of Systems   Constitutional:  Negative for activity change and fever.   Musculoskeletal:  Positive for back pain.   Neurological:  Negative for weakness and numbness.       History  Past Medical  History:   Diagnosis Date    Injury of back    ,   Past Surgical History:   Procedure Laterality Date    APPENDECTOMY      BACK SURGERY     , History reviewed. No pertinent family history.,   Social History     Socioeconomic History    Marital status:    Tobacco Use    Smoking status: Every Day     Current packs/day: 0.50     Types: Cigarettes   Vaping Use    Vaping status: Never Used   Substance and Sexual Activity    Alcohol use: Not Currently    Drug use: Not Currently    Sexual activity: Defer     E-cigarette/Vaping    E-cigarette/Vaping Use Never User     Passive Exposure No     Counseling Given Yes      E-cigarette/Vaping Substances     E-cigarette/Vaping Devices         ,   Medications Prior to Admission   Medication Sig Dispense Refill Last Dose    meloxicam (MOBIC) 15 MG tablet Take 1 tablet by mouth Daily. 30 tablet 1     methocarbamol (ROBAXIN) 750 MG tablet Take 1 tablet by mouth 4 (Four) Times a Day As Needed for Muscle Spasms. 60 tablet 0 4/22/2024    albuterol sulfate  (90 Base) MCG/ACT inhaler Inhale 2 puffs Every 4 (Four) Hours As Needed for Wheezing. (Patient not taking: Reported on 4/15/2024) 18 g 0     benzonatate (TESSALON) 200 MG capsule Take 1 capsule by mouth 3 (Three) Times a Day As Needed for Cough. (Patient not taking: Reported on 4/15/2024) 20 capsule 0     fluticasone (FLONASE) 50 MCG/ACT nasal spray 2 sprays into the nostril(s) as directed by provider Daily. (Patient not taking: Reported on 4/15/2024) 9.9 mL 0     guaiFENesin (MUCINEX) 600 MG 12 hr tablet Take 1 tablet by mouth Every 12 (Twelve) Hours. (Patient not taking: Reported on 4/15/2024) 14 tablet 0     HYDROcodone-acetaminophen (NORCO) 5-325 MG per tablet Take 1-2 tablets by mouth Every 6 (Six) Hours As Needed for Severe Pain. (Patient not taking: Reported on 4/15/2024) 15 tablet 0     lidocaine (LIDODERM) 5 % Place 1 patch on the skin as directed by provider Daily. Remove & Discard patch within 12 hours or as  directed by MD (Patient not taking: Reported on 4/15/2024) 30 each 0     predniSONE (DELTASONE) 10 MG tablet Take 6 tabs by mouth once daily day 1, 5 on day 2, 4 on day 3, 3 on day 4, 2 on day 5, 1 on day 6 (Patient not taking: Reported on 4/15/2024) 21 tablet 0    , Scheduled Meds:  Lidocaine, 1 patch, Transdermal, Q24H  meloxicam, 15 mg, Oral, Daily  nicotine, 1 patch, Transdermal, Q24H  sodium chloride, 10 mL, Intravenous, Q12H    , Continuous Infusions:   , PRN Meds:    HYDROcodone-acetaminophen    methocarbamol    ondansetron    Potassium Replacement - Follow Nurse / BPA Driven Protocol    Insert Peripheral IV **AND** sodium chloride    sodium chloride    sodium chloride, and Allergies:  Patient has no known allergies.    Tobacco Use: High Risk (4/23/2024)    Patient History     Smoking Tobacco Use: Every Day     Smokeless Tobacco Use: Unknown     Passive Exposure: Not on file        Objective     Vital Signs   Temp:  [97 °F (36.1 °C)-98.6 °F (37 °C)] 98.2 °F (36.8 °C)  Heart Rate:  [] 70  Resp:  [16-18] 16  BP: (102-134)/() 106/74  Body mass index is 31.53 kg/m².    Physical Exam    CONSTITUTIONAL: This 44 year old   male appears well developed, well-nourished and appears comfortable lying in bed with his head of bed up about 20 degrees    HEAD & FACE: the head and face are symmetric, normocephalic and atraumatic.    EYES: Inspection of the conjunctivae and lids reveals no swelling, erythema or discharge.  Pupils are round, equal and reactive to light and there is no scleral icterus.    EARS, NOSE, MOUTH & THROAT:  On inspection, the ears, nose and oral cavity are within normal limits.    NECK: The neck is supple and symmetric. The trachea is midline with no masses.    PULMONARY: Respiratory effort is normal with no increased work of breathing or signs of respiratory distress.    CARDIOVASCULAR: Examination of the extremities shows no edema or varicosities.    MUSCULOSKELETAL: Gait and  station are not assessed as he is quite uncomfortable when getting up. The spine has some soreness to palpation    SKIN: The skin is warm, dry and intact    NEUROLOGIC:    Cranial Nerves 2-12 grossly intact  Normal motor strength noted although he does guard the left lower extremity.  Pain. Muscle bulk and tone are normal.  Sensory exam is normal to fine touch to confrontational testing bilaterally in both lower extremities symmetrically.  Reflexes on the right side demonstrates 1/4 Knee Jerk Reflex, 1/4 Ankle Jerk Reflex and no ankle clonus on the right.   Reflexes on the left side demonstrates 1/4 Knee Jerk Reflex, 1/4 Ankle  Jerk Reflex and no ankle clonus on the left.  Superficial/Primitive Reflexes: primitive reflexes were absent.  Straight raising test reveals back pain lifting either leg but he denies leg pain when lifting either leg  No coordination deficit observed.  Cortical function is intact and without deficits. Speech is normal.    PSYCHIATRIC: Oriented to person, place and time. Patient's mood and affect are normal.      Results Review:   I reviewed the patient's new clinical results.    Images:    I personally reviewed the images from the following radiographic studies.    Lumbar radiographs done on April 18, 2024 at [facility] reveal no evidence of lumbar spine instability on flexion-extension with decreased disc height identified at L5-S1.    CT scan of the lumbar spine done at [facility] on 4/2/2024 reveals degenerative change in the lower 3 lumbar levels.  The radiologist comments there could be some suggestion of canal narrowing and neuroforaminal narrowing at L4-5 S1.    MRI thoracic spine done last night with and without contrast reveals no significant canal stenosis or neuroforaminal impingement.  No deformities.    MRI of the lumbar spine done with and without contrast last night reveals degenerative change at the lower 2 lumbar levels L4-5 and L5-S1.  Small central disc protrusion  identified at L4-5 mildly indenting the ventral subarachnoid space with mild neuroforaminal narrowing.  At L5-S1 similar small central disc protrusion mildly indents the ventral subarachnoid space with the possibility of some right lateral recess narrowing.  Specifically no left-sided neuroforaminal narrowing.  Although the patient has had prior surgery is difficult to tell the level although I suspect it may have been for L4L5 on the left.  Prior MRIs could not be identified for comparison.            Lab Results (last 24 hours)       Procedure Component Value Units Date/Time    Basic Metabolic Panel [[id number]]  (Abnormal) Collected: 04/23/24 1539    Specimen: Blood Updated: 04/23/24 1705     Glucose 155 mg/dL      BUN 12 mg/dL      Creatinine 1.07 mg/dL      Sodium 139 mmol/L      Potassium 3.4 mmol/L      Chloride 103 mmol/L      CO2 21.0 mmol/L      Calcium 9.5 mg/dL      BUN/Creatinine Ratio 11.2     Anion Gap 15.0 mmol/L      eGFR 87.2 mL/min/1.73     Narrative:      GFR Normal >60  Chronic Kidney Disease <60  Kidney Failure <15      CBC & Differential [[id number]]  (Abnormal) Collected: 04/23/24 1539    Specimen: Blood Updated: 04/23/24 1710    Narrative:      The following orders were created for panel order CBC & Differential.  Procedure                               Abnormality         Status                     ---------                               -----------         ------                     CBC Auto Differential[[id number]]        Abnormal            Final result                 Please view results for these tests on the individual orders.    Sedimentation Rate [[id number]]  (Abnormal) Collected: 04/23/24 1539    Specimen: Blood Updated: 04/23/24 1720     Sed Rate 35 mm/hr     CBC Auto Differential [[id number]]  (Abnormal) Collected: 04/23/24 1539    Specimen: Blood Updated: 04/23/24 1710     WBC 10.69 10*3/mm3      RBC 5.09 10*6/mm3      Hemoglobin 16.0 g/dL      Hematocrit 46.2 %      MCV 90.8 fL       MCH 31.4 pg      MCHC 34.6 g/dL      RDW 14.3 %      RDW-SD 47.7 fl      MPV 11.8 fL      Platelets 268 10*3/mm3      Neutrophil % 67.5 %      Lymphocyte % 25.0 %      Monocyte % 5.8 %      Eosinophil % 0.9 %      Basophil % 0.2 %      Immature Grans % 0.6 %      Neutrophils, Absolute 7.22 10*3/mm3      Lymphocytes, Absolute 2.67 10*3/mm3      Monocytes, Absolute 0.62 10*3/mm3      Eosinophils, Absolute 0.10 10*3/mm3      Basophils, Absolute 0.02 10*3/mm3      Immature Grans, Absolute 0.06 10*3/mm3      nRBC 0.0 /100 WBC     CBC (No Diff) [[id number]]  (Normal) Collected: 04/24/24 0500    Specimen: Blood from Arm, Right Updated: 04/24/24 0549     WBC 8.98 10*3/mm3      RBC 4.90 10*6/mm3      Hemoglobin 14.9 g/dL      Hematocrit 45.8 %      MCV 93.5 fL      MCH 30.4 pg      MCHC 32.5 g/dL      RDW 14.5 %      RDW-SD 50.3 fl      MPV 11.1 fL      Platelets 231 10*3/mm3     Basic Metabolic Panel [[id number]]  (Abnormal) Collected: 04/24/24 0500    Specimen: Blood from Arm, Right Updated: 04/24/24 0550     Glucose 189 mg/dL      BUN 18 mg/dL      Creatinine 1.18 mg/dL      Sodium 137 mmol/L      Potassium 5.3 mmol/L      Chloride 107 mmol/L      CO2 18.5 mmol/L      Calcium 9.1 mg/dL      BUN/Creatinine Ratio 15.3     Anion Gap 11.5 mmol/L      eGFR 77.5 mL/min/1.73     Narrative:      GFR Normal >60  Chronic Kidney Disease <60  Kidney Failure <15              Assessment & Plan       Acute midline low back pain with left-sided sciatica    Lower back pain    History of back surgery      Patient does not appear to have recurrent disc herniation based on a quality MRI with and without contrast.  Given this exacerbation of back pain with left-sided sciatica there is still a fairly wide differential beyond disc herniation and in the presence of having prior surgery this could be inflammatory in nature or could be an episode of piriformis syndrome.  The outpatient assessment by neurosurgery last week felt that he is good  candidate for physical therapy and I agree that would likely resolve his symptoms with that technique.  Given his acute nature of pain should likely benefit from an epidural steroid injection to start things off as he approaches physical therapy.  I talked to the outpatient department providers this morning and they were going to try to arrange a pain management epidural steroid injection to allow him to be discharged home later today on a Medrol Dosepak to follow-up with physical therapy as ordered.  He can follow-up with us in [facility] since he prefers this location over the [facility] location.  My office will arrange follow-up in 4 weeks.    I discussed the patient's findings and my recommendations with patient, family, and primary care team    Sohail Goncalves MD  04/24/24  08:55 EDT

## 2024-04-24 NOTE — CODE DOCUMENTATION
"Patient Name:  Roque Reveles  YOB: 1980  MRN:  [id number]  Admit Date:  4/23/2024    Visit Diagnoses:     ICD-10-CM ICD-9-CM   1. Acute midline low back pain with left-sided sciatica  M54.42 724.2     724.3   2. Incontinence of feces, unspecified fecal incontinence type  R15.9 787.60   3. Elevated sed rate  R70.0 790.1   4. Hyperglycemia  R73.9 790.29   5. Lumbar radiculopathy  M54.16 724.4       Reason For Rapid: pt had epidural in pain management, post procedure pt had episode of shaking/tremors. He was responsive to voice. Concern for local anesthesia toxicity, Lipids infused per protocol. Pt continued to have tremors, but began to calm down after time.     RN Communicated With: Dr. Haney at bedside, aLcy RN spoke with [facility] pharmacist to assure there was no further treatment for potential toxicity. Edwina RN spoke with OBS unit to assure they were comfortable continuing to monitor patient.       Rapid Outcome: return to OBS unit    Communication From Rapid Team: reaction believed to be anxiety, pt also treated appropriately per protocol for anesthesia tox. VSS, pt agreeable to plan of care.     Most Recent Vital Signs  Temp:  [97 °F (36.1 °C)-98.6 °F (37 °C)] 97.6 °F (36.4 °C)  Heart Rate:  [] 89  Resp:  [14-30] 16  BP: (102-137)/() 127/91  SpO2:  [93 %-99 %] 94 %  on  Flow (L/min):  [0-10] 0;   Device (Oxygen Therapy): nasal cannula    Labs:      No results found for: \"POCGLU\"  No results found for: \"SITE\", \"ALLENTEST\", \"PHART\", \"WGC9OHW\", \"PO2ART\", \"HMW3ZWD\", \"BASEEXCESS\", \"R0YAUDEP\", \"HGBBG\", \"HCTABG\", \"OXYHEMOGLOBI\", \"METHHGBN\", \"CARBOXYHGB\", \"CO2CT\", \"BAROMETRIC\", \"MODALITY\", \"FIO2\"  Results from last 7 days   Lab Units 04/24/24  0500 04/23/24  1539   WBC 10*3/mm3 8.98 10.69   HEMOGLOBIN g/dL 14.9 16.0   PLATELETS 10*3/mm3 231 268     Results from last 7 days   Lab Units 04/24/24  0500 04/23/24  1539   SODIUM mmol/L 137 139   POTASSIUM mmol/L 5.3* 3.4*   CHLORIDE mmol/L 107 " 103   CO2 mmol/L 18.5* 21.0*   BUN mg/dL 18 12   CREATININE mg/dL 1.18 1.07   GLUCOSE mg/dL 189* 155*   Estimated Creatinine Clearance: 85.2 mL/min (by C-G formula based on SCr of 1.18 mg/dL).            Lab Results   Component Value Date    STREPPNEUAG Negative 10/19/2022    LEGANTIGENUR Negative 10/19/2022           NIH Stroke Scale:                                                         Please refer to full rapid documentation on summary page under Index / Code Timeline

## 2024-04-24 NOTE — PLAN OF CARE
Goal Outcome Evaluation:      Pt. Admitted to observation from the ED with low back pain. Pain uncontrolled. Pt. Given [place] and Toradol. Pt. Refuses the muscle relaxer. MRI completed. PT and neurosurgery consulted. IV steroids given. Potassium replaced with 2 doses. Pt. Demanded to go outside to smoke, educated pt. On safety. Gave nicotine patch. Pt. Aware of treatment plan.

## 2024-04-24 NOTE — PROCEDURES
Lumbar Transforaminal Epidural Steroid Injection (two levels)  [facility]      PREOPERATIVE DIAGNOSIS:  Lumbar Degenerative Disc Disease, Lumbar Postlaminectomy Syndrome, and left Lumbar Radiculopathy    POSTOPERATIVE DIAGNOSIS:  Same as preop diagnosis    PROCEDURE:    1. CPT 37407 --  Diagnostic Transforaminal Epidural Steroid Injection at the L4 level, on the left   2. CPT 58072 --  Diagnostic Transforaminal Epidural Steroid Injection at the L5 level, on the left     PRE-PROCEDURE DISCUSSION WITH PATIENT:    Risks and complications were discussed with the patient prior to starting the procedure and informed consent was obtained.  We discussed various topics including but not limited to bleeding, infection, injury, nerve injury, paralysis, coma, death, postprocedural painful flare-up, postprocedural site soreness, and a lack of pain relief.  We discussed the diagnostic aspect of transforaminal epidural / selective nerve root blockade.    SURGEON:  Fortino Haney MD    REASON FOR PROCEDURE:    Degenerative changes are noted in the area., Radiating pattern of pain is likely consistent with degenerative changes in the area., and Radicular pain pattern seems consistent with this dermatome.    SEDATION:  Versed 6mg IV, The use of increased procedural sedation was carefully considered, and for this particular patient the need for additional procedural sedation seemed necessary in this instance to safely perform the procedure., and The patient had higher than average levels of procedural anxiety and the need to provide additional procedural sedation was needed to safely proceed.  ANESTHETIC:  Marcaine 0.25%  STEROID:  Methylprednisolone (DEPO MEDROL) 80mg/ml    DESCRIPTON OF PROCEDURE:  After obtaining informed consent, an I.V. was accessed in the preoperative area. The patient taken to the operating room and was placed in the prone position with a pillow under the abdomen.  All pressure points were well  padded.  EKG, blood pressure, and pulse oximeter were monitored.  The lumbar area was prepped with Chloraprep and draped in a sterile fashion. Under fluoroscopic guidance in an oblique dimension on the above mentioned side, the transverse process of the first aforementioned vertebra at the junction of the body at 6 o'clock position was identified. Skin and subcutaneous tissue was anesthetized with 1% lidocaine. A 22-gauge spinal needle was introduced under fluoroscopic guidance at the above junction into the foramen without parasthesias and into the epidural space. After confirming the position of the needle with PA, lateral, and oblique fluoroscopic views, aspiration was checked and was clear of blood or CSF.  Next, 1 mL of Omnipaque was injected. After seeing adequate spread on the corresponding nerve root, a total volume 2mL of injectate containing local anesthetic as above and half of the above mentioned corticosteroid was injected into the epidural space.  The needle was removed intact.      Next, in similar fashion, the second level mentioned above was addressed and a similar amount of injectate was delivered after similar imaging was achieved.      Vital signs were stable throughout.          ESTIMATED BLOOD LOSS:  <5 mL  SPECIMENS:  none    COMPLICATIONS:   There was no indication of vascular uptake on live injection of contrast dye., There was no indication of intrathecal uptake on live injection of contrast dye., There was not any evidence of dural puncture.  , and The patient did not have any signs of postprocedure numbness nor weakness.     ADDENDUM: He had some noted tremulousness immediately on arrival to the PACU area and decreased responsiveness.  Differential diagnosis included potential for local anesthetic toxicity, seizure, pseudoseizure, other cardiopulmonary event.  He is seen hemodynamically stable but there is a lot of artifact on the monitors.  Heart rate on palpation did not exceed 100.   With the artifact it was difficult to obtain a consistent pulse ox but her numbers were in the 96-98 range throughout.  He was wearing nasal cannula.  Out of abundance of caution we gave a bolus 250 mL of Intralipid and called a rapid response.  I had opportunity to discuss with inpatient pharmacist and to ensure that he had gotten an appropriate dose of Intralipid.  Also had opportunity to speak with the physician assistant who has been taking care of him on the observation unit floor.  Plan will be to send him back to the observation unit as he is hemodynamically stable still.      TOLERANCE & DISCHARGE CONDITION:    The patient tolerated the procedure well.  The patient was transported to the recovery area without difficulties.  The patient was discharged to home under the care of family in stable and satisfactory condition.    PLAN OF CARE:  The patient was given our standard instruction sheet.  The patient will Plan for follow up with neurosurgeon .  The patient will resume all medications as per the medication reconciliation sheet.

## 2024-04-29 ENCOUNTER — TELEPHONE (OUTPATIENT)
Dept: NEUROSURGERY | Facility: CLINIC | Age: 44
End: 2024-04-29
Payer: MEDICAID

## 2024-04-29 ENCOUNTER — TELEPHONE (OUTPATIENT)
Dept: PAIN MEDICINE | Facility: CLINIC | Age: 44
End: 2024-04-29

## 2024-04-29 NOTE — TELEPHONE ENCOUNTER
Dr. Haney sent me a message that he was going to follow him in his office after the TERESA completed last week.  He may need to contact them.  His MRI was negative for surgical pathology so if he fails pain management we will need to get a lumbar myelogram.

## 2024-04-29 NOTE — TELEPHONE ENCOUNTER
Caller: Roque Reveles    Relationship to patient: Self    Best call back number: [phone]    Patient is needing: PT WAS GIVEN AN INJECTION LAST WEEK BY DR BONDS AND IS NOW BEING REFERRED OVER TO HIS OFFICE BY DR YOUSIF BECAUSE THEY WILL NOT TREAT HIM FOR THE LOWER BACK PAIN.. PT IS WANTING TO BE SEEN PLEASE ADVISE..

## 2024-04-29 NOTE — TELEPHONE ENCOUNTER
Patient has an appointment with  on 5/23/2024, he has had a selective nerve root injection and he is currently taking prednisone and using Lidocaine patches, but he is still experiencing severe pain. I explained to him that Dr. Goncalves will not prescribe any pain medication, but we would check and see if there is something else that can done

## 2024-04-30 LAB — GLUCOSE BLDC GLUCOMTR-MCNC: 132 MG/DL (ref 70–130)

## 2024-05-01 NOTE — TELEPHONE ENCOUNTER
Caller: Roque Reveles    Relationship: Self    Best call back number: [phone]    What is the best time to reach you: ANYTIME    What was the call regarding: ATTEMPTED TO WT. PT MISSED CALL IN REGARDS TO THIS ENCOUNTER. PLEASE CONTACT PT AGAIN AT THE ABOVE NUMBER.

## 2024-05-20 NOTE — PROGRESS NOTES
"Subjective   History of Present Illness: Roque Reveles is a 44 y.o. male is here today for follow-up after being see in the [facility] for back pain that radiates into the left leg.    Today, Mr. Reveles reports back and left leg pain. He has been going to PT only 2 visits and after the second visit the physical therapist told him that he probably should get better pain relief before coming back.  He states that he noticed some improvement in his back pain after the epidural steroid injection for about 24 hours.  His pain is primarily in the back although it does radiate into the left leg.  He denies any bowel or bladder symptomatology.     History of Present Illness    Tobacco Use: High Risk (5/23/2024)    Patient History     Smoking Tobacco Use: Every Day     Smokeless Tobacco Use: Unknown     Passive Exposure: Not on file        The following portions of the patient's history were reviewed and updated as appropriate: allergies, current medications, past family history, past medical history, past social history, past surgical history and problem list.    Review of Systems    Objective     Vitals:    05/23/24 1040   BP: 128/80   Weight: 91.2 kg (201 lb)   Height: 170.2 cm (67\")     Body mass index is 31.48 kg/m².      Physical Exam  Neurologic Exam    Physical Exam:    CONSTITUTIONAL:  appears well developed, well-nourished and in no acute distress.    NECK: the neck is supple and symmetric. The trachea is midline with no masses.      PULMONARY: Respiratory effort is normal with no increased work of breathing or signs of respiratory distress.    CARDIOVASCULAR: Pedal pulses are +2/4 bilaterally. Examination of the extremities shows no edema or varicosities.    MUSCULOSKELETAL: Gait wide-based with a bent forward posture    SKIN: The skin is warm, dry and intact.  Very small lumbar incision left lateral to the midline and L4-5 or L5-S1    NEUROLOGIC:   Normal motor strength noted in both lower extremities despite " some guarding in the left leg. Muscle bulk and tone are normal.  Sensory exam is normal to fine touch  Reflexes are symmetrical at the knees and 2/4 and symmetrical at the ankle 1/4  Severely raising test reveals left-sided back pain only  Cortical function is intact and without deficits. Speech is normal.    PSYCHIATRIC: oriented to person, place and time. Patient's mood and affect are normal.    Assessment & Plan   Independent Review of Radiographic Studies:      I personally reviewed the images from the following studies.    Lumbar radiographs done on April 18, 2024 at [facility] reveal no evidence of lumbar spine instability on flexion-extension with decreased disc height identified at L5-S1.     CT scan of the lumbar spine done at [facility] on 4/2/2024 reveals degenerative change in the lower 3 lumbar levels.  The radiologist comments there could be some suggestion of canal narrowing and neuroforaminal narrowing at L4-5 S1.     MRI thoracic spine done last night with and without contrast reveals no significant canal stenosis or neuroforaminal impingement.  No deformities.     MRI of the lumbar spine done with and without contrast last night reveals degenerative change at the lower 2 lumbar levels L4-5 and L5-S1.  Small central disc protrusion identified at L4-5 mildly indenting the ventral subarachnoid space with mild neuroforaminal narrowing.  At L5-S1 similar small central disc protrusion mildly indents the ventral subarachnoid space with the possibility of some right lateral recess narrowing.  Specifically no left-sided neuroforaminal narrowing.  Although the patient has had prior surgery is difficult to tell the level although I suspect it may have been for L4L5 on the left.  Prior MRIs could not be identified for comparison.            Medical Decision Making:      Clinically and radiographically the patient does not have radiographic findings nor a clinical examination that would be amenable to  any operative intervention.  Given his acute on chronic exacerbation of his low back pain radiating to the left side I have suggested we enlist the help of a physical medicine and rehab specialist to help coordinate the medical management along with his physical therapy.  He may need functional capacity examination to avoid recurrences.    I will also have him follow-up with Dr. Haney to consider additional treatment such as a second epidural steroid injection or since he really does not have any recurrent left-sided neuroforaminal or canal stenosis he might be a better candidate consider medial branch blocks/ablation procedures on the facets.    I will see any role for any neurosurgical intervention in this scenario but should he develop progressive symptomatology we can always reassess.    No follow-ups on file.    Diagnoses and all orders for this visit:    1. Acute midline low back pain with left-sided sciatica (Primary)  -     Ambulatory Referral to Physical Medicine Rehab  -     Ambulatory Referral to Pain Management Clinic    2. History of back surgery  -     Ambulatory Referral to Physical Medicine Rehab  -     Ambulatory Referral to Pain Management Clinic             Sohail Goncalves MD FACS [facility]  Neurological Surgery

## 2024-05-23 ENCOUNTER — OFFICE VISIT (OUTPATIENT)
Dept: NEUROSURGERY | Facility: CLINIC | Age: 44
End: 2024-05-23
Payer: MEDICAID

## 2024-05-23 VITALS
WEIGHT: 201 LBS | SYSTOLIC BLOOD PRESSURE: 128 MMHG | HEIGHT: 67 IN | DIASTOLIC BLOOD PRESSURE: 80 MMHG | BODY MASS INDEX: 31.55 KG/M2

## 2024-05-23 DIAGNOSIS — Z98.890 HISTORY OF BACK SURGERY: ICD-10-CM

## 2024-05-23 DIAGNOSIS — M54.42 ACUTE MIDLINE LOW BACK PAIN WITH LEFT-SIDED SCIATICA: Primary | ICD-10-CM
